# Patient Record
Sex: FEMALE | Race: WHITE | URBAN - METROPOLITAN AREA
[De-identification: names, ages, dates, MRNs, and addresses within clinical notes are randomized per-mention and may not be internally consistent; named-entity substitution may affect disease eponyms.]

---

## 2017-06-02 ENCOUNTER — EMERGENCY (EMERGENCY)
Facility: HOSPITAL | Age: 82
LOS: 1 days | Discharge: HOME | End: 2017-06-02
Attending: EMERGENCY MEDICINE
Payer: MEDICARE

## 2017-06-02 VITALS
WEIGHT: 108.03 LBS | OXYGEN SATURATION: 98 % | DIASTOLIC BLOOD PRESSURE: 59 MMHG | HEART RATE: 63 BPM | SYSTOLIC BLOOD PRESSURE: 135 MMHG | RESPIRATION RATE: 18 BRPM | HEIGHT: 61 IN | TEMPERATURE: 98 F

## 2017-06-02 VITALS
SYSTOLIC BLOOD PRESSURE: 131 MMHG | HEART RATE: 62 BPM | DIASTOLIC BLOOD PRESSURE: 67 MMHG | TEMPERATURE: 98 F | OXYGEN SATURATION: 100 % | RESPIRATION RATE: 19 BRPM

## 2017-06-02 DIAGNOSIS — Z98.49 CATARACT EXTRACTION STATUS, UNSPECIFIED EYE: Chronic | ICD-10-CM

## 2017-06-02 DIAGNOSIS — Z90.5 ACQUIRED ABSENCE OF KIDNEY: Chronic | ICD-10-CM

## 2017-06-02 DIAGNOSIS — Z96.619 PRESENCE OF UNSPECIFIED ARTIFICIAL SHOULDER JOINT: Chronic | ICD-10-CM

## 2017-06-02 PROCEDURE — 99285 EMERGENCY DEPT VISIT HI MDM: CPT | Mod: 25

## 2017-06-02 PROCEDURE — 72125 CT NECK SPINE W/O DYE: CPT | Mod: 26

## 2017-06-02 PROCEDURE — 70486 CT MAXILLOFACIAL W/O DYE: CPT | Mod: 26

## 2017-06-02 PROCEDURE — 70450 CT HEAD/BRAIN W/O DYE: CPT

## 2017-06-02 PROCEDURE — 70450 CT HEAD/BRAIN W/O DYE: CPT | Mod: 26

## 2017-06-02 PROCEDURE — 70486 CT MAXILLOFACIAL W/O DYE: CPT

## 2017-06-02 PROCEDURE — 72125 CT NECK SPINE W/O DYE: CPT

## 2017-06-02 NOTE — ED PROVIDER NOTE - OBJECTIVE STATEMENT
85 y/o F pt w/ PMHx of dementia and TIA presents to the ED c/o fall. Pt's daughter states that she was walking up a step when she slipped and fell. Pt fell in the past and had bleeding in her brain. Pt on Aggrenox. Denies LOC, neck pain or any other complaints. NKDA.

## 2017-06-02 NOTE — ED PROVIDER NOTE - MEDICAL DECISION MAKING DETAILS
Patient with fall forward, hit head on step, no lacs, no LOC, vomiting or neck pain, normal exam with mild facial bruising. Full ROM of all extremities, CT head/c spine/facial bones to r/o ICH, fx. DC if negative, no pain at this time.

## 2017-06-02 NOTE — ED ADULT TRIAGE NOTE - CHIEF COMPLAINT QUOTE
As per daughter walking up the stairs tripped and fell hit nose and head denies loc, has history of bleeding on the brain from previous fall, c/o pain to forehead and nose

## 2017-06-02 NOTE — ED PROVIDER NOTE - SKIN, MLM
Skin normal color for race, warm, dry and intact. No evidence of rash. Skin normal color for race, warm, dry and intact. No evidence of rash. Bruise to the L forehead, no hematoma

## 2017-06-02 NOTE — ED PROVIDER NOTE - NS ED MD SCRIBE ATTENDING SCRIBE SECTIONS
DISPOSITION/VITAL SIGNS( Pullset)/REVIEW OF SYSTEMS/HISTORY OF PRESENT ILLNESS/PHYSICAL EXAM/HIV/PAST MEDICAL/SURGICAL/SOCIAL HISTORY

## 2017-06-02 NOTE — ED PROVIDER NOTE - MUSCULOSKELETAL, MLM
Spine appears normal, range of motion is not limited, no muscle or joint tenderness Spine appears normal, range of motion is not limited, no muscle or joint tenderness. Normal gait
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
## 2017-06-06 DIAGNOSIS — Z91.81 HISTORY OF FALLING: ICD-10-CM
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
## 2017-06-06 DIAGNOSIS — F03.90 UNSPECIFIED DEMENTIA, UNSPECIFIED SEVERITY, WITHOUT BEHAVIORAL DISTURBANCE, PSYCHOTIC DISTURBANCE, MOOD DISTURBANCE, AND ANXIETY: ICD-10-CM

## 2017-06-06 DIAGNOSIS — S09.90XA UNSPECIFIED INJURY OF HEAD, INITIAL ENCOUNTER: ICD-10-CM

## 2017-06-06 DIAGNOSIS — Z86.73 PERSONAL HISTORY OF TRANSIENT ISCHEMIC ATTACK (TIA), AND CEREBRAL INFARCTION WITHOUT RESIDUAL DEFICITS: ICD-10-CM

## 2018-03-22 ENCOUNTER — EMERGENCY (EMERGENCY)
Facility: HOSPITAL | Age: 83
LOS: 1 days | Discharge: HOME | End: 2018-03-22
Attending: EMERGENCY MEDICINE | Admitting: SPECIALIST
Payer: MEDICARE

## 2018-03-22 VITALS
SYSTOLIC BLOOD PRESSURE: 116 MMHG | DIASTOLIC BLOOD PRESSURE: 59 MMHG | TEMPERATURE: 98 F | RESPIRATION RATE: 18 BRPM | HEART RATE: 59 BPM | WEIGHT: 231.49 LBS | HEIGHT: 61 IN | OXYGEN SATURATION: 98 %

## 2018-03-22 DIAGNOSIS — Z96.619 PRESENCE OF UNSPECIFIED ARTIFICIAL SHOULDER JOINT: Chronic | ICD-10-CM

## 2018-03-22 DIAGNOSIS — Z98.49 CATARACT EXTRACTION STATUS, UNSPECIFIED EYE: Chronic | ICD-10-CM

## 2018-03-22 DIAGNOSIS — Z90.5 ACQUIRED ABSENCE OF KIDNEY: Chronic | ICD-10-CM

## 2018-03-22 PROCEDURE — 70450 CT HEAD/BRAIN W/O DYE: CPT

## 2018-03-22 PROCEDURE — 70450 CT HEAD/BRAIN W/O DYE: CPT | Mod: 26

## 2018-03-22 PROCEDURE — 99284 EMERGENCY DEPT VISIT MOD MDM: CPT | Mod: 25

## 2018-03-22 PROCEDURE — 99284 EMERGENCY DEPT VISIT MOD MDM: CPT

## 2018-03-22 NOTE — ED PROVIDER NOTE - OBJECTIVE STATEMENT
85 y/o F pt with PMHx of Dementia and TIA and PSHx of Shoulder Replacement, R Nephrectomy, and Cataract Surgery BIB EMS with family to ED with bruising to the forehead s/p mechanical fall with head trauma at 18:30pm today (x3 hours PTA in ED). Pt's daughter reports pt fell on her buttocks earlier today, and struck her head on a wooden object. In ED, pt is currently at baseline with no complaints. Family denies pt LOC, or any other complaints. Family notes pt is normally ambulatory with a walker at baseline. Family reports pt is taken care of by home health aides at home. NKDA.

## 2018-03-22 NOTE — ED PROVIDER NOTE - MEDICAL DECISION MAKING DETAILS
83 y/o F pt presents with closed head injury. Pt with Hx of dementia. Plan for CT Head, and anticipate d/c if normal.

## 2018-04-11 ENCOUNTER — INPATIENT (INPATIENT)
Facility: HOSPITAL | Age: 83
LOS: 3 days | Discharge: HOME | DRG: 92 | End: 2018-04-15
Attending: INTERNAL MEDICINE | Admitting: INTERNAL MEDICINE
Payer: MEDICARE

## 2018-04-11 VITALS
OXYGEN SATURATION: 98 % | RESPIRATION RATE: 16 BRPM | DIASTOLIC BLOOD PRESSURE: 84 MMHG | TEMPERATURE: 98 F | SYSTOLIC BLOOD PRESSURE: 127 MMHG

## 2018-04-11 DIAGNOSIS — R29.6 REPEATED FALLS: ICD-10-CM

## 2018-04-11 DIAGNOSIS — Z96.619 PRESENCE OF UNSPECIFIED ARTIFICIAL SHOULDER JOINT: Chronic | ICD-10-CM

## 2018-04-11 DIAGNOSIS — Z96.659 PRESENCE OF UNSPECIFIED ARTIFICIAL KNEE JOINT: Chronic | ICD-10-CM

## 2018-04-11 DIAGNOSIS — Z98.49 CATARACT EXTRACTION STATUS, UNSPECIFIED EYE: Chronic | ICD-10-CM

## 2018-04-11 DIAGNOSIS — Z90.5 ACQUIRED ABSENCE OF KIDNEY: Chronic | ICD-10-CM

## 2018-04-11 LAB
ACETONE SERPL-MCNC: NEGATIVE — SIGNIFICANT CHANGE UP
ALBUMIN SERPL ELPH-MCNC: 3.4 G/DL — LOW (ref 3.5–5)
ALP SERPL-CCNC: 97 U/L — SIGNIFICANT CHANGE UP (ref 40–120)
ALT FLD-CCNC: 20 U/L DA — SIGNIFICANT CHANGE UP (ref 10–60)
ANION GAP SERPL CALC-SCNC: 7 MMOL/L — SIGNIFICANT CHANGE UP (ref 5–17)
APPEARANCE UR: CLEAR — SIGNIFICANT CHANGE UP
APTT BLD: 33.7 SEC — SIGNIFICANT CHANGE UP (ref 27.5–37.4)
AST SERPL-CCNC: 22 U/L — SIGNIFICANT CHANGE UP (ref 10–40)
BACTERIA # UR AUTO: ABNORMAL /HPF
BASOPHILS # BLD AUTO: 0.1 K/UL — SIGNIFICANT CHANGE UP (ref 0–0.2)
BASOPHILS NFR BLD AUTO: 1.2 % — SIGNIFICANT CHANGE UP (ref 0–2)
BILIRUB SERPL-MCNC: 0.3 MG/DL — SIGNIFICANT CHANGE UP (ref 0.2–1.2)
BILIRUB UR-MCNC: NEGATIVE — SIGNIFICANT CHANGE UP
BUN SERPL-MCNC: 14 MG/DL — SIGNIFICANT CHANGE UP (ref 7–18)
CALCIUM SERPL-MCNC: 9.1 MG/DL — SIGNIFICANT CHANGE UP (ref 8.4–10.5)
CHLORIDE SERPL-SCNC: 105 MMOL/L — SIGNIFICANT CHANGE UP (ref 96–108)
CK SERPL-CCNC: 175 U/L — SIGNIFICANT CHANGE UP (ref 21–215)
CO2 SERPL-SCNC: 30 MMOL/L — SIGNIFICANT CHANGE UP (ref 22–31)
COLOR SPEC: YELLOW — SIGNIFICANT CHANGE UP
CREAT SERPL-MCNC: 0.92 MG/DL — SIGNIFICANT CHANGE UP (ref 0.5–1.3)
DIFF PNL FLD: ABNORMAL
EOSINOPHIL # BLD AUTO: 0.1 K/UL — SIGNIFICANT CHANGE UP (ref 0–0.5)
EOSINOPHIL NFR BLD AUTO: 1.2 % — SIGNIFICANT CHANGE UP (ref 0–6)
EPI CELLS # UR: ABNORMAL /HPF
GLUCOSE SERPL-MCNC: 92 MG/DL — SIGNIFICANT CHANGE UP (ref 70–99)
GLUCOSE UR QL: NEGATIVE — SIGNIFICANT CHANGE UP
HCT VFR BLD CALC: 41 % — SIGNIFICANT CHANGE UP (ref 34.5–45)
HGB BLD-MCNC: 12.3 G/DL — SIGNIFICANT CHANGE UP (ref 11.5–15.5)
INR BLD: 1.13 RATIO — SIGNIFICANT CHANGE UP (ref 0.88–1.16)
KETONES UR-MCNC: NEGATIVE — SIGNIFICANT CHANGE UP
LACTATE SERPL-SCNC: 1.2 MMOL/L — SIGNIFICANT CHANGE UP (ref 0.7–2)
LEUKOCYTE ESTERASE UR-ACNC: NEGATIVE — SIGNIFICANT CHANGE UP
LYMPHOCYTES # BLD AUTO: 2.2 K/UL — SIGNIFICANT CHANGE UP (ref 1–3.3)
LYMPHOCYTES # BLD AUTO: 32.6 % — SIGNIFICANT CHANGE UP (ref 13–44)
MAGNESIUM SERPL-MCNC: 2.2 MG/DL — SIGNIFICANT CHANGE UP (ref 1.6–2.6)
MCHC RBC-ENTMCNC: 28.2 PG — SIGNIFICANT CHANGE UP (ref 27–34)
MCHC RBC-ENTMCNC: 30 GM/DL — LOW (ref 32–36)
MCV RBC AUTO: 93.9 FL — SIGNIFICANT CHANGE UP (ref 80–100)
MONOCYTES # BLD AUTO: 0.5 K/UL — SIGNIFICANT CHANGE UP (ref 0–0.9)
MONOCYTES NFR BLD AUTO: 7 % — SIGNIFICANT CHANGE UP (ref 2–14)
NEUTROPHILS # BLD AUTO: 3.9 K/UL — SIGNIFICANT CHANGE UP (ref 1.8–7.4)
NEUTROPHILS NFR BLD AUTO: 58 % — SIGNIFICANT CHANGE UP (ref 43–77)
NITRITE UR-MCNC: NEGATIVE — SIGNIFICANT CHANGE UP
PH UR: 5 — SIGNIFICANT CHANGE UP (ref 5–8)
PLATELET # BLD AUTO: 328 K/UL — SIGNIFICANT CHANGE UP (ref 150–400)
POTASSIUM SERPL-MCNC: 3.9 MMOL/L — SIGNIFICANT CHANGE UP (ref 3.5–5.3)
POTASSIUM SERPL-SCNC: 3.9 MMOL/L — SIGNIFICANT CHANGE UP (ref 3.5–5.3)
PROT SERPL-MCNC: 7.2 G/DL — SIGNIFICANT CHANGE UP (ref 6–8.3)
PROT UR-MCNC: NEGATIVE — SIGNIFICANT CHANGE UP
PROTHROM AB SERPL-ACNC: 12.3 SEC — SIGNIFICANT CHANGE UP (ref 9.8–12.7)
RBC # BLD: 4.37 M/UL — SIGNIFICANT CHANGE UP (ref 3.8–5.2)
RBC # FLD: 13.2 % — SIGNIFICANT CHANGE UP (ref 10.3–14.5)
RBC CASTS # UR COMP ASSIST: ABNORMAL /HPF (ref 0–2)
SODIUM SERPL-SCNC: 142 MMOL/L — SIGNIFICANT CHANGE UP (ref 135–145)
SP GR SPEC: 1.01 — SIGNIFICANT CHANGE UP (ref 1.01–1.02)
UROBILINOGEN FLD QL: NEGATIVE — SIGNIFICANT CHANGE UP
WBC # BLD: 6.7 K/UL — SIGNIFICANT CHANGE UP (ref 3.8–10.5)
WBC # FLD AUTO: 6.7 K/UL — SIGNIFICANT CHANGE UP (ref 3.8–10.5)
WBC UR QL: SIGNIFICANT CHANGE UP /HPF (ref 0–5)

## 2018-04-11 PROCEDURE — 99285 EMERGENCY DEPT VISIT HI MDM: CPT

## 2018-04-11 PROCEDURE — 71260 CT THORAX DX C+: CPT | Mod: 26

## 2018-04-11 PROCEDURE — 74177 CT ABD & PELVIS W/CONTRAST: CPT | Mod: 26

## 2018-04-11 RX ORDER — ONDANSETRON 8 MG/1
4 TABLET, FILM COATED ORAL ONCE
Qty: 0 | Refills: 0 | Status: COMPLETED | OUTPATIENT
Start: 2018-04-11 | End: 2018-04-11

## 2018-04-11 RX ORDER — MORPHINE SULFATE 50 MG/1
4 CAPSULE, EXTENDED RELEASE ORAL ONCE
Qty: 0 | Refills: 0 | Status: DISCONTINUED | OUTPATIENT
Start: 2018-04-11 | End: 2018-04-11

## 2018-04-11 RX ORDER — SODIUM CHLORIDE 9 MG/ML
1000 INJECTION INTRAMUSCULAR; INTRAVENOUS; SUBCUTANEOUS
Qty: 0 | Refills: 0 | Status: DISCONTINUED | OUTPATIENT
Start: 2018-04-11 | End: 2018-04-13

## 2018-04-11 RX ADMIN — SODIUM CHLORIDE 125 MILLILITER(S): 9 INJECTION INTRAMUSCULAR; INTRAVENOUS; SUBCUTANEOUS at 23:16

## 2018-04-11 RX ADMIN — ONDANSETRON 4 MILLIGRAM(S): 8 TABLET, FILM COATED ORAL at 16:00

## 2018-04-11 RX ADMIN — SODIUM CHLORIDE 125 MILLILITER(S): 9 INJECTION INTRAMUSCULAR; INTRAVENOUS; SUBCUTANEOUS at 16:41

## 2018-04-11 RX ADMIN — MORPHINE SULFATE 4 MILLIGRAM(S): 50 CAPSULE, EXTENDED RELEASE ORAL at 16:30

## 2018-04-11 RX ADMIN — MORPHINE SULFATE 4 MILLIGRAM(S): 50 CAPSULE, EXTENDED RELEASE ORAL at 16:00

## 2018-04-11 NOTE — ED ADULT NURSE REASSESSMENT NOTE - NS ED NURSE REASSESS COMMENT FT1
Pt. is in stable condition. No complaints voiced. family at bedside. Pt. is stable to go to [address] [place] [address] for continued care.

## 2018-04-11 NOTE — ED PROVIDER NOTE - MUSCULOSKELETAL, MLM
Spine appears kyphotic, range of motion is not limited, R lower back slight tenderness to palpation, straight leg 90 degrees. Anterior R lower rib tenderness to palpation.

## 2018-04-11 NOTE — ED ADULT NURSE NOTE - FINAL NURSING ELECTRONIC SIGNATURE
I personally performed the service described in the documentation recorded by the scribe in my presence, and it accurately and completely records my words and actions.
11-Apr-2018 22:48

## 2018-04-11 NOTE — ED PROVIDER NOTE - OBJECTIVE STATEMENT
83 y/o F pt w/ PMHx of TIA, Dementia BIB daughter; as per daughter, pt fell twice this week fell off a step at home last night c/o R sided lower rib pain, back pain and abd pain. No head injury or LOC, no vomiting, no fever. Pt saw PMD and was sent in for further eval. NKDA.

## 2018-04-11 NOTE — ED PROVIDER NOTE - PROGRESS NOTE DETAILS
frequent falls, lower rib pain, abd pain-no acute pathology, will admit to Dr. Finley.  Spoke with Dr. Foster, pt's neurologist. case d/w Dr. Finley

## 2018-04-11 NOTE — ED PROVIDER NOTE - MEDICAL DECISION MAKING DETAILS
pt s/p fall twice this week, now with R lower rib pain and abd pain, concern for fx and abd trauma, will get CTs, labs and admit.

## 2018-04-11 NOTE — ED PROVIDER NOTE - PSH
H/O shoulder replacement    History of nephrectomy  right  S/P cataract surgery    S/P knee replacement

## 2018-04-12 ENCOUNTER — TRANSCRIPTION ENCOUNTER (OUTPATIENT)
Age: 83
End: 2018-04-12

## 2018-04-12 DIAGNOSIS — R29.6 REPEATED FALLS: ICD-10-CM

## 2018-04-12 DIAGNOSIS — F03.90 UNSPECIFIED DEMENTIA WITHOUT BEHAVIORAL DISTURBANCE: ICD-10-CM

## 2018-04-12 DIAGNOSIS — R91.1 SOLITARY PULMONARY NODULE: ICD-10-CM

## 2018-04-12 DIAGNOSIS — E04.1 NONTOXIC SINGLE THYROID NODULE: ICD-10-CM

## 2018-04-12 DIAGNOSIS — Z29.9 ENCOUNTER FOR PROPHYLACTIC MEASURES, UNSPECIFIED: ICD-10-CM

## 2018-04-12 LAB
24R-OH-CALCIDIOL SERPL-MCNC: 20.3 NG/ML — LOW (ref 30–80)
ANION GAP SERPL CALC-SCNC: 5 MMOL/L — SIGNIFICANT CHANGE UP (ref 5–17)
BUN SERPL-MCNC: 14 MG/DL — SIGNIFICANT CHANGE UP (ref 7–18)
CALCIUM SERPL-MCNC: 8.5 MG/DL — SIGNIFICANT CHANGE UP (ref 8.4–10.5)
CHLORIDE SERPL-SCNC: 108 MMOL/L — SIGNIFICANT CHANGE UP (ref 96–108)
CHOLEST SERPL-MCNC: 112 MG/DL — SIGNIFICANT CHANGE UP (ref 10–199)
CO2 SERPL-SCNC: 30 MMOL/L — SIGNIFICANT CHANGE UP (ref 22–31)
CREAT SERPL-MCNC: 0.96 MG/DL — SIGNIFICANT CHANGE UP (ref 0.5–1.3)
GLUCOSE SERPL-MCNC: 85 MG/DL — SIGNIFICANT CHANGE UP (ref 70–99)
HBA1C BLD-MCNC: 5.5 % — SIGNIFICANT CHANGE UP (ref 4–5.6)
HCT VFR BLD CALC: 36.3 % — SIGNIFICANT CHANGE UP (ref 34.5–45)
HDLC SERPL-MCNC: 49 MG/DL — SIGNIFICANT CHANGE UP (ref 40–125)
HGB BLD-MCNC: 10.9 G/DL — LOW (ref 11.5–15.5)
LIPID PNL WITH DIRECT LDL SERPL: 41 MG/DL — SIGNIFICANT CHANGE UP
MCHC RBC-ENTMCNC: 28.6 PG — SIGNIFICANT CHANGE UP (ref 27–34)
MCHC RBC-ENTMCNC: 30.1 GM/DL — LOW (ref 32–36)
MCV RBC AUTO: 95.3 FL — SIGNIFICANT CHANGE UP (ref 80–100)
PLATELET # BLD AUTO: 277 K/UL — SIGNIFICANT CHANGE UP (ref 150–400)
POTASSIUM SERPL-MCNC: 4.2 MMOL/L — SIGNIFICANT CHANGE UP (ref 3.5–5.3)
POTASSIUM SERPL-SCNC: 4.2 MMOL/L — SIGNIFICANT CHANGE UP (ref 3.5–5.3)
RBC # BLD: 3.81 M/UL — SIGNIFICANT CHANGE UP (ref 3.8–5.2)
RBC # FLD: 13.6 % — SIGNIFICANT CHANGE UP (ref 10.3–14.5)
SODIUM SERPL-SCNC: 143 MMOL/L — SIGNIFICANT CHANGE UP (ref 135–145)
TOTAL CHOLESTEROL/HDL RATIO MEASUREMENT: 2.3 RATIO — LOW (ref 3.3–7.1)
TRIGL SERPL-MCNC: 112 MG/DL — SIGNIFICANT CHANGE UP (ref 10–149)
TSH SERPL-MCNC: 6.67 UU/ML — HIGH (ref 0.34–4.82)
VIT B12 SERPL-MCNC: 393 PG/ML — SIGNIFICANT CHANGE UP (ref 232–1245)
WBC # BLD: 4.7 K/UL — SIGNIFICANT CHANGE UP (ref 3.8–10.5)
WBC # FLD AUTO: 4.7 K/UL — SIGNIFICANT CHANGE UP (ref 3.8–10.5)

## 2018-04-12 PROCEDURE — 70450 CT HEAD/BRAIN W/O DYE: CPT | Mod: 26

## 2018-04-12 RX ORDER — CLONAZEPAM 1 MG
0.5 TABLET ORAL ONCE
Qty: 0 | Refills: 0 | Status: DISCONTINUED | OUTPATIENT
Start: 2018-04-12 | End: 2018-04-12

## 2018-04-12 RX ORDER — CHOLECALCIFEROL (VITAMIN D3) 125 MCG
1000 CAPSULE ORAL DAILY
Qty: 0 | Refills: 0 | Status: DISCONTINUED | OUTPATIENT
Start: 2018-04-12 | End: 2018-04-13

## 2018-04-12 RX ORDER — OXYCODONE AND ACETAMINOPHEN 5; 325 MG/1; MG/1
1 TABLET ORAL EVERY 6 HOURS
Qty: 0 | Refills: 0 | Status: DISCONTINUED | OUTPATIENT
Start: 2018-04-12 | End: 2018-04-15

## 2018-04-12 RX ORDER — ENOXAPARIN SODIUM 100 MG/ML
40 INJECTION SUBCUTANEOUS DAILY
Qty: 0 | Refills: 0 | Status: DISCONTINUED | OUTPATIENT
Start: 2018-04-12 | End: 2018-04-15

## 2018-04-12 RX ORDER — ACETAMINOPHEN 500 MG
650 TABLET ORAL EVERY 6 HOURS
Qty: 0 | Refills: 0 | Status: DISCONTINUED | OUTPATIENT
Start: 2018-04-12 | End: 2018-04-15

## 2018-04-12 RX ADMIN — Medication 0.5 MILLIGRAM(S): at 21:33

## 2018-04-12 RX ADMIN — ENOXAPARIN SODIUM 40 MILLIGRAM(S): 100 INJECTION SUBCUTANEOUS at 11:50

## 2018-04-12 RX ADMIN — SODIUM CHLORIDE 125 MILLILITER(S): 9 INJECTION INTRAMUSCULAR; INTRAVENOUS; SUBCUTANEOUS at 06:40

## 2018-04-12 RX ADMIN — SODIUM CHLORIDE 125 MILLILITER(S): 9 INJECTION INTRAMUSCULAR; INTRAVENOUS; SUBCUTANEOUS at 21:34

## 2018-04-12 NOTE — H&P ADULT - PROBLEM SELECTOR PLAN 4
IMPROVE VTE Individual Risk Assessment    RISK                                                          Points  [] Previous VTE                                           3  [] Thrombophilia                                        2  [] Lower limb paralysis                              2   [] Current Cancer                                       2   [x] Immobilization > 24 hrs                        1  [] ICU/CCU stay > 24 hours                       1  [x] Age > 60                                                   1    IMPROVE VTE Score: 2    need for DVT ppx, on lovenox  no need for GI ppx baseline AAO X 0-1  Pt daughter [place] [phone] will bring pt home meds in the morning, primary care teat please follow up

## 2018-04-12 NOTE — H&P ADULT - ATTENDING COMMENTS
83 y/o female admitted with frequent falls.   PMH;severe dementia,rt nephrectomy for RCC, tia  Work-up neg-except 4mm nodule rt middle lobe.  PLAN; phy tx/neuro f/u Dr. Foster,. 85 y/o female admitted with frequent falls.   PMH;severe dementia,rt nephrectomy for RCC, tia  Work-up neg-except 4mm nodule rt middle lobe.  PLAN; phy tx/neuro f/u Dr. Foster,.           ct chest 1 yr.

## 2018-04-12 NOTE — H&P ADULT - HISTORY OF PRESENT ILLNESS
Patient is a 85 yo female from home with 24 hrs HHA, baseline walking with assistant baseline AAO x1 with PMH of TIA, severe dementia BIB her daughter.  As  per daughter, pt fell down twice this week, and fell off a step at home last night, c/o R sided lower rib pain, radiating to right back and abd pain. No head injury or LOC as per daughter. Pt is very poor historian, obtained history from her daughter and ED provider. As per daughter, pt had no fever, chills,  headache, chest pain, nausea, vomiting, diarrhea, constipation or any other complains. Pt saw her PCP Dr. Cristian KILLIAN yesterday and was sent to ER for further eval.  Pt got her flu vaccine 10/2017.    In ER, pt vitals wnl, labs wnl, UA negative, CT chest and abd shows 9 mm nonspecific hypodense lesion in the left lobe of the thyroid, nonspecific 4 mm right middle lobe lung nodule; 2 left renal cysts measuring up to 1.4 cm. Patient is a 85 yo female from home with 24 hrs HHA, baseline walking with assistant baseline AAO x1 with PMH of TIA, severe dementia BIB her daughter.  As  per daughter, pt fell down twice this week, and fell off a step at home last night, c/o R sided lower rib pain, radiating to right back and abd pain. No head injury or LOC as per daughter. Pt is very poor historian, obtained history from her daughter and ED provider. As per daughter, pt had no fever, chills,  headache, chest pain, nausea, vomiting, diarrhea, constipation or any other complains. Pt saw her PCP Dr. Cristian KILLIAN yesterday and was sent to ER for further eval.  Pt got her flu vaccine 10/2017. Pt daughter [place] [phone] will bring pt home meds in the morning, primary care teat please follow up.     In ER, pt vitals wnl, labs wnl, UA negative, CT chest and abd shows no acute fracture,  9 mm nonspecific hypodense lesion in the left lobe of the thyroid, nonspecific 4 mm right middle lobe lung nodule; 2 left renal cysts measuring up to 1.4 cm. s/p 4mg morphine IV in ED. Patient is a 83 yo female from home with 24 hrs HHA, baseline walking with assistant baseline AAO x1 with PMH of TIA, severe dementia, renal cell cancer (s/p Rt nephrectomy ) BIB her daughter.  As  per daughter, pt fell down twice this week, and fell off a step at home last night, c/o R sided lower rib pain, radiating to right back and abd pain. No head injury or LOC as per daughter. Pt is very poor historian, obtained history from her daughter and ED provider. As per daughter, pt had no fever, chills,  headache, chest pain, nausea, vomiting, diarrhea, constipation or any other complains. Pt saw her PCP Dr. Cristian KILLIAN yesterday and was sent to ER for further eval.  Pt got her flu vaccine 10/2017. Pt daughter [place] [phone] will bring pt home meds in the morning, primary care teat please follow up.     In ER, pt vitals wnl, labs wnl, UA negative, CT chest and abd shows no acute fracture,  9 mm nonspecific hypodense lesion in the left lobe of the thyroid, nonspecific 4 mm right middle lobe lung nodule; 2 left renal cysts measuring up to 1.4 cm. s/p 4mg morphine IV in ED.

## 2018-04-12 NOTE — H&P ADULT - PROBLEM SELECTOR PLAN 5
IMPROVE VTE Individual Risk Assessment    RISK                                                          Points  [] Previous VTE                                           3  [] Thrombophilia                                        2  [] Lower limb paralysis                              2   [] Current Cancer                                       2   [x] Immobilization > 24 hrs                        1  [] ICU/CCU stay > 24 hours                       1  [x] Age > 60                                                   1    IMPROVE VTE Score: 2    need for DVT ppx, on lovenox  no need for GI ppx

## 2018-04-12 NOTE — PHYSICAL THERAPY INITIAL EVALUATION ADULT - GROSSLY INTACT, SENSORY
Grossly Intact/to touch, but assessment limited by impaired cognition and ability to follow commands

## 2018-04-12 NOTE — DISCHARGE NOTE ADULT - PLAN OF CARE
social support You have no fractures. You will go home with home physical therapy and 24/7 aide. Please obtain a follow up CT scan of your chest in 12 months to ensure stability of lung nodule. Please follow up with your PCP to have your thyroid function tests rechecked. At this time, your TSH is elevated but your free T4 and T3 are normal. You do not require treatment for thyroid disorder. Please continue taking nuedexta, namzaric, duloxetine, clonazepam as you were before. Follow up with your PCP. Please continue aggrenox, metoprolol, simvastatin. You have Mild age indeterminate compression fractures at the superior endplates of   L1 and L2 vertebrae . You will go home with home physical therapy and 24/7 aide.

## 2018-04-12 NOTE — PHYSICAL THERAPY INITIAL EVALUATION ADULT - ADDITIONAL COMMENTS
Per sister, pt. owns sc and rolling walker.    Pt. reports falling when not using a device and at night.

## 2018-04-12 NOTE — DISCHARGE NOTE ADULT - PATIENT PORTAL LINK FT
You can access the [facility] Patient Portal, offered by [facility], by registering with the following website: [email or web address]

## 2018-04-12 NOTE — H&P ADULT - PROBLEM SELECTOR PLAN 1
CT chest no acute fracture  s/p morphine 4mg IV in ER  started tylenol and percocet for pain management  f/u CT head  f/u PT eval pt had falls x2 this wk, c/o Rt rib pain  CT chest and abd shows no evidence for acute intrathoracic, intra-abdominal or   intrapelvic injury  s/p morphine 4mg IV in ER  started tylenol and percocet for pain management  f/u CT head  f/u PT eval

## 2018-04-12 NOTE — PHYSICAL THERAPY INITIAL EVALUATION ADULT - IMPAIRMENTS FOUND, PT EVAL
gait, locomotion, and balance/poor safety awareness/aerobic capacity/endurance/cognitive impairment/muscle strength/posture

## 2018-04-12 NOTE — CONSULT NOTE ADULT - ASSESSMENT
1.r/o thyroid nodule  left lobe  as seen in ct chest  needs thy sono for further assessment  can be done as op  2.elevated tsh  subclinical hypothyroidism  free t4  no intervention presently  follow tft  3.low vit d  replace

## 2018-04-12 NOTE — H&P ADULT - PROBLEM SELECTOR PLAN 2
pt is former smoker   CT chest shows nonspecific 4 mm right middle lobe lung nodule  will follow-up chest CT may be pursued in 12 months to ensure stability

## 2018-04-12 NOTE — DISCHARGE NOTE ADULT - CARE PLAN
Principal Discharge DX:	Frequent falls  Goal:	social support  Assessment and plan of treatment:	You have no fractures. You will go home with home physical therapy and 24/7 aide.  Secondary Diagnosis:	Lung nodule  Assessment and plan of treatment:	Please obtain a follow up CT scan of your chest in 12 months to ensure stability of lung nodule.  Secondary Diagnosis:	Thyroid nodule  Assessment and plan of treatment:	Please follow up with your PCP to have your thyroid function tests rechecked. At this time, your TSH is elevated but your free T4 and T3 are normal. You do not require treatment for thyroid disorder.  Secondary Diagnosis:	Dementia  Assessment and plan of treatment:	Please continue taking nuedexta, namzaric, duloxetine, clonazepam as you were before. Follow up with your PCP.  Secondary Diagnosis:	TIA (transient ischemic attack)  Assessment and plan of treatment:	Please continue aggrenox, metoprolol, simvastatin. Principal Discharge DX:	Frequent falls  Goal:	social support  Assessment and plan of treatment:	You have Mild age indeterminate compression fractures at the superior endplates of   L1 and L2 vertebrae . You will go home with home physical therapy and 24/7 aide.  Secondary Diagnosis:	Lung nodule  Assessment and plan of treatment:	Please obtain a follow up CT scan of your chest in 12 months to ensure stability of lung nodule.  Secondary Diagnosis:	Thyroid nodule  Assessment and plan of treatment:	Please follow up with your PCP to have your thyroid function tests rechecked. At this time, your TSH is elevated but your free T4 and T3 are normal. You do not require treatment for thyroid disorder.  Secondary Diagnosis:	Dementia  Assessment and plan of treatment:	Please continue taking nuedexta, namzaric, duloxetine, clonazepam as you were before. Follow up with your PCP.  Secondary Diagnosis:	TIA (transient ischemic attack)  Assessment and plan of treatment:	Please continue aggrenox, metoprolol, simvastatin.

## 2018-04-12 NOTE — PHYSICAL THERAPY INITIAL EVALUATION ADULT - CRITERIA FOR SKILLED THERAPEUTIC INTERVENTIONS
impairments found/risk reduction/prevention/therapy frequency/anticipated discharge recommendation/rehab potential/functional limitations in following categories/predicted duration of therapy intervention

## 2018-04-12 NOTE — H&P ADULT - ASSESSMENT
Patient is a 83 yo female from home with 24 hrs HHA, baseline walking with assistant baseline AAO x1 with PMH of TIA, severe dementia BIB her daughter.  As  per daughter, pt fell down twice this week, and fell off a step at home last night, c/o R sided lower rib pain, radiating to right back and abd pain. No head injury or LOC as per daughter.  In ER, pt vitals wnl, labs wnl, UA negative, CT chest and abd shows no acute fracture,  9 mm nonspecific hypodense lesion in the left lobe of the thyroid, nonspecific 4 mm right middle lobe lung nodule; 2 left renal cysts measuring up to 1.4 cm. s/p 4mg morphine IV in ED.    Pt will be admitted to medicine for s/p fall. Patient is a 83 yo female from home with 24 hrs HHA, baseline walking with assistant baseline AAO x1 with PMH of TIA, severe dementia, renal cell cancer (s/p Rt nephrectomy )  BIB her daughter.  As  per daughter, pt fell down twice this week, and fell off a step at home last night, c/o R sided lower rib pain, radiating to right back and abd pain. No head injury or LOC as per daughter.  In ER, pt vitals wnl, labs wnl, UA negative, CT chest and abd shows no acute fracture,  9 mm nonspecific hypodense lesion in the left lobe of the thyroid, nonspecific 4 mm right middle lobe lung nodule; 2 left renal cysts measuring up to 1.4 cm. s/p 4mg morphine IV in ED.    Pt will be admitted to medicine for s/p fall.

## 2018-04-12 NOTE — DISCHARGE NOTE ADULT - MEDICATION SUMMARY - MEDICATIONS TO TAKE
I will START or STAY ON the medications listed below when I get home from the hospital:    clonazePAM 1 mg oral tablet  -- 1 tab(s) by mouth 3 times a day, As Needed for anxiety  -- Indication: For Anxiety    DULoxetine 30 mg oral delayed release capsule  -- orally 2 times a day  -- Indication: For Depression    simvastatin 20 mg oral tablet  -- 1 tab(s) by mouth once a day (at bedtime)  -- Indication: For hyperlipidemia    aspirin-dipyridamole 25 mg-200 mg oral capsule, extended release  -- 1 cap(s) by mouth 2 times a day  -- Indication: For Need for prophylactic measure    metoprolol succinate 50 mg oral tablet, extended release  -- 1 tab(s) by mouth once a day  -- Indication: For hypertension    Namzaric 28 mg-10 mg oral capsule, extended release  -- 1 cap(s) by mouth once a day  -- Indication: For Dementia    Nuedexta 20 mg-10 mg oral capsule  -- 1 cap(s) by mouth every 12 hours  -- Indication: For Dementia     ergocalciferol 50,000 intl units (1.25 mg) oral capsule  -- 1 cap(s) by mouth once a week  -- Indication: For Supplement

## 2018-04-12 NOTE — CONSULT NOTE ADULT - SUBJECTIVE AND OBJECTIVE BOX
HPI:  Patient is a 83 yo female from home with 24 hrs HHA, baseline walking with assistant baseline AAO x1 with PMH of TIA, severe dementia, renal cell cancer (s/p Rt nephrectomy ) BIB her daughter.  As  per daughter, pt fell down twice this week, and fell off a step at home last night, c/o R sided lower rib pain, radiating to right back and abd pain. No head injury or LOC as per daughter. Pt is very poor historian, obtained history from her daughter and ED provider. As per daughter, pt had no fever, chills,  headache, chest pain, nausea, vomiting, diarrhea, constipation or any other complains. Pt saw her PCP Dr. Cristian KILLIAN yesterday and was sent to ER for further eval.  Pt got her flu vaccine 10/2017. Pt daughter [place] [phone] will bring pt home meds in the morning, primary care teat please follow up.     In ER, pt vitals wnl, labs wnl, UA negative, CT chest and abd shows no acute fracture,  9 mm nonspecific hypodense lesion in the left lobe of the thyroid, nonspecific 4 mm right middle lobe lung nodule; 2 left renal cysts measuring up to 1.4 cm. s/p 4mg morphine IV in ED. (2018 02:23)  labs show elevated tsh/low vit d  no documented h/o thyroid problems  hist from chart  pt awake but cannot give details    PAST MEDICAL & SURGICAL HISTORY:  Renal cell cancer  TIA (transient ischemic attack)  Dementia  S/P knee replacement  S/P cataract surgery  H/O shoulder replacement  History of nephrectomy: right      No Known Allergies      acetaminophen   Tablet. 650 milliGRAM(s) Oral every 6 hours PRN  enoxaparin Injectable 40 milliGRAM(s) SubCutaneous daily  oxyCODONE    5 mG/acetaminophen 325 mG 1 Tablet(s) Oral every 6 hours PRN  sodium chloride 0.9%. 1000 milliLiter(s) IV Continuous <Continuous>      Social Hx:    FAMILY HISTORY:  No pertinent family history in first degree relatives      REVIEW OF SYSTEMS:  pt cannot give details  CONSTITUTIONAL: No weakness, fevers or chills  EYES/ENT: No visual changes;  No vertigo or throat pain   NECK: No pain or stiffness  RESPIRATORY: No cough, wheezing, hemoptysis; No shortness of breath  CARDIOVASCULAR: No chest pain or palpitations  GASTROINTESTINAL: No abdominal or epigastric pain. No nausea, vomiting, or hematemesis; No diarrhea or constipation. No melena or hematochezia.  GENITOURINARY: No dysuria, frequency or hematuria  NEUROLOGICAL: No numbness or weakness  SKIN: No itching, burning, rashes, or lesions   All other review of systems is negative unless indicated above.  PHYSICAL EXAM:    Vital Signs Last 24 Hrs  T(C): 37.1 (2018 14:43), Max: 37.1 (2018 14:43)  T(F): 98.7 (2018 14:43), Max: 98.7 (2018 14:43)  HR: 77 (2018 14:43) (70 - 77)  BP: 120/55 (2018 14:43) (100/51 - 129/66)  BP(mean): --  RR: 17 (2018 14:43) (17 - 18)  SpO2: 98% (2018 14:43) (94% - 100%)    Constitutional:    HEENT:elderly female  awake  neck thyroid low lying  no palpable nodule  no stridor  lungs ae fair  cardia reg  abd soft  neuro moves all limbs  Neck:  [  ] Supple  [  ]Lymphadenopathy  [  ] JVD   [  ]No JVD  [  ] Masses   [  ] WNL    CHEST/Respiratory:  [  ] Rales      [  ] Rhonchi      [no  ] Wheezing       [  ] Chest Tenderness  [  ]Clear to auscultation    Cardiovascular:  [  ]S1 S2  [  ] Reg  [  ] Irreg   [  ] Murmurs  [  ]Systolic [  ]Diastolic  [  ]No Murmur    Abdomen:  [  ]  Bowel Sounds   [  ] Soft           [  ] ABD Distention  [  ] Tenderness  [  ] Organomegaly                        [  ] Guarding Rigidity  [  ] No Guarding Rigidity  [  ] Rebound Tenderness [  ] No Rebound Tenderness    Extremities: [  ] Edema  [  ] No edema  [  ] Clubbing   [  ] Cyanosis  [  ] Palpable peripheral pulses                        [  ] No Tender Calf muscles  [  ] Tender Calf muscles    Neurological:  [  ] Alert  [  ] Awake  [  ] Oriented  x                              [  ] Confused    Skin:  [  ] Thrombophlebitis  [  ] Rashes  [  ] Dry  [  ] Ulcers    Ortho:  [  ] Joint Swelling  [  ] Joint erythema [  ] DJD [  ] Increased temp. to touch      LABS/DIAGNOSTIC TESTS                          10.9   4.7   )-----------( 277      ( 2018 07:33 )             36.3     WBC Count: 4.7 K/uL (04-12 @ 07:33)  WBC Count: 6.7 K/uL ( @ 16:20)          143  |  108  |  14  ----------------------------<  85  4.2   |  30  |  0.96    Ca    8.5      2018 07:33  Mg     2.2         TPro  7.2  /  Alb  3.4<L>  /  TBili  0.3  /  DBili  x   /  AST  22  /  ALT  20  /  AlkPhos  97        Urinalysis Basic - ( 2018 22:31 )    Color: Yellow / Appearance: Clear / S.010 / pH: x  Gluc: x / Ketone: Negative  / Bili: Negative / Urobili: Negative   Blood: x / Protein: Negative / Nitrite: Negative   Leuk Esterase: Negative / RBC: 2-5 /HPF / WBC 0-2 /HPF   Sq Epi: x / Non Sq Epi: Moderate /HPF / Bacteria: Few /HPF        LIVER FUNCTIONS - ( 2018 16:20 )  Alb: 3.4 g/dL / Pro: 7.2 g/dL / ALK PHOS: 97 U/L / ALT: 20 U/L DA / AST: 22 U/L / GGT: x             PT/INR - ( 2018 16:20 )   PT: 12.3 sec;   INR: 1.13 ratio         PTT - ( 2018 16:20 )  PTT:33.7 sec    LACTATE:      CULTURES:   acetaminophen   Tablet. 650 milliGRAM(s) Oral every 6 hours PRN  enoxaparin Injectable 40 milliGRAM(s) SubCutaneous daily  oxyCODONE    5 mG/acetaminophen 325 mG 1 Tablet(s) Oral every 6 hours PRN  sodium chloride 0.9%. 1000 milliLiter(s) IV Continuous <Continuous>      RADIOLOGY    CXR:

## 2018-04-12 NOTE — PHYSICAL THERAPY INITIAL EVALUATION ADULT - MANUAL MUSCLE TESTING RESULTS, REHAB EVAL
at least 4/ 5 at extremities and 3+/5 postural muscles. Assessment was limited by impaired ability to follow commands

## 2018-04-12 NOTE — H&P ADULT - NSHPLABSRESULTS_GEN_ALL_CORE
Comprehensive Metabolic Panel (04.11.18 @ 16:20)    Sodium, Serum: 142 mmol/L    Potassium, Serum: 3.9 mmol/L    Chloride, Serum: 105 mmol/L    Carbon Dioxide, Serum: 30 mmol/L    Anion Gap, Serum: 7 mmol/L    Blood Urea Nitrogen, Serum: 14 mg/dL    Creatinine, Serum: 0.92 mg/dL    Glucose, Serum: 92 mg/dL    Calcium, Total Serum: 9.1 mg/dL    Protein Total, Serum: 7.2 g/dL    Albumin, Serum: 3.4 g/dL    Bilirubin Total, Serum: 0.3 mg/dL    Alkaline Phosphatase, Serum: 97 U/L    Aspartate Aminotransferase (AST/SGOT): 22 U/L    Alanine Aminotransferase (ALT/SGPT): 20 U/L DA    eGFR if Non : 57: Interpretative comment  The units for eGFR are ml/min/1.73m2 (normalized body surface area). The  eGFR is calculated from a serum creatinine using the CKD-EPI equation.  Other variables required for calculation are race, age and sex. Among  patients with chronic kidney disease (CKD), the eGFR is useful in  determining the stage of disease according to KDOQI CKD classification.  All eGFR results are reported numerically with the following  interpretation.          GFR                    With                 Without     (ml/min/1.73 m2)    Kidney Damage       Kidney Damage        >= 90                    Stage 1                     Normal        60-89                    Stage 2                     Decreased GFR        30-59     Stage 3                     Stage 3        15-29                    Stage 4                     Stage 4        < 15                      Stage 5                     Stage 5  Each stage of CKD assumes that the associated GFR level has been in  effect for at least 3 months. Determination of stages one and two (with  eGFR > 59 ml/min/m2) requires estimation of kidney damage for at least 3  months as defined by structural or functional abnormalities.  Limitations: All estimates of GFR will be less accurate for patients at  extremes of muscle mass (including but not limited to frail elderly,  critically ill, or cancer patients), those with unusual diets, and those  with conditions associated with reduced secretion or extrarenal  elimination of creatinine. The eGFR equation is not recommended for use  in patients with unstable creatinine levels. mL/min/1.73M2    eGFR if African American: 66 mL/min/1.73M2    Complete Blood Count + Automated Diff (04.11.18 @ 16:20)    WBC Count: 6.7 K/uL    RBC Count: 4.37 M/uL    Hemoglobin: 12.3 g/dL    Hematocrit: 41.0 %    Mean Cell Volume: 93.9 fl    Mean Cell Hemoglobin: 28.2 pg    Mean Cell Hemoglobin Conc: 30.0 gm/dL    Red Cell Distrib Width: 13.2 %    Platelet Count - Automated: 328 K/uL    Auto Neutrophil #: 3.9 K/uL    Auto Lymphocyte #: 2.2 K/uL    Auto Monocyte #: 0.5 K/uL    Auto Eosinophil #: 0.1 K/uL    Auto Basophil #: 0.1 K/uL    Auto Neutrophil %: 58.0: Differential percentages must be correlated with absolute numbers for  clinical significance. %    Auto Lymphocyte %: 32.6 %    Auto Monocyte %: 7.0 %    Auto Eosinophil %: 1.2 %    Auto Basophil %: 1.2 %      < from: CT Chest w/ IV Cont (04.11.18 @ 19:51) >    Impression: No evidence for acute intrathoracic, intra-abdominal or   intrapelvic injury.    Trace right pleural effusion.    Mild age indeterminate compression fractures at the superior endplates of   L1 and L2 vertebrae.    Nonspecific 4 mm right middle lobe lung nodule; if the patient is in the   high risk category (i.e. smoker), follow-up chest CT may be pursued in 12   months to ensure stability.      < end of copied text >

## 2018-04-13 LAB
ANION GAP SERPL CALC-SCNC: 6 MMOL/L — SIGNIFICANT CHANGE UP (ref 5–17)
BASOPHILS # BLD AUTO: 0.1 K/UL — SIGNIFICANT CHANGE UP (ref 0–0.2)
BASOPHILS NFR BLD AUTO: 1 % — SIGNIFICANT CHANGE UP (ref 0–2)
BUN SERPL-MCNC: 10 MG/DL — SIGNIFICANT CHANGE UP (ref 7–18)
CALCIUM SERPL-MCNC: 8.7 MG/DL — SIGNIFICANT CHANGE UP (ref 8.4–10.5)
CHLORIDE SERPL-SCNC: 109 MMOL/L — HIGH (ref 96–108)
CO2 SERPL-SCNC: 28 MMOL/L — SIGNIFICANT CHANGE UP (ref 22–31)
CREAT SERPL-MCNC: 0.78 MG/DL — SIGNIFICANT CHANGE UP (ref 0.5–1.3)
CULTURE RESULTS: NO GROWTH — SIGNIFICANT CHANGE UP
EOSINOPHIL # BLD AUTO: 0.1 K/UL — SIGNIFICANT CHANGE UP (ref 0–0.5)
EOSINOPHIL NFR BLD AUTO: 1.3 % — SIGNIFICANT CHANGE UP (ref 0–6)
GLUCOSE SERPL-MCNC: 93 MG/DL — SIGNIFICANT CHANGE UP (ref 70–99)
HCT VFR BLD CALC: 34.9 % — SIGNIFICANT CHANGE UP (ref 34.5–45)
HGB BLD-MCNC: 10.8 G/DL — LOW (ref 11.5–15.5)
LYMPHOCYTES # BLD AUTO: 1.5 K/UL — SIGNIFICANT CHANGE UP (ref 1–3.3)
LYMPHOCYTES # BLD AUTO: 30.3 % — SIGNIFICANT CHANGE UP (ref 13–44)
MAGNESIUM SERPL-MCNC: 1.9 MG/DL — SIGNIFICANT CHANGE UP (ref 1.6–2.6)
MCHC RBC-ENTMCNC: 28.9 PG — SIGNIFICANT CHANGE UP (ref 27–34)
MCHC RBC-ENTMCNC: 31 GM/DL — LOW (ref 32–36)
MCV RBC AUTO: 93.4 FL — SIGNIFICANT CHANGE UP (ref 80–100)
MONOCYTES # BLD AUTO: 0.4 K/UL — SIGNIFICANT CHANGE UP (ref 0–0.9)
MONOCYTES NFR BLD AUTO: 8.2 % — SIGNIFICANT CHANGE UP (ref 2–14)
NEUTROPHILS # BLD AUTO: 2.9 K/UL — SIGNIFICANT CHANGE UP (ref 1.8–7.4)
NEUTROPHILS NFR BLD AUTO: 59.2 % — SIGNIFICANT CHANGE UP (ref 43–77)
PHOSPHATE SERPL-MCNC: 3.7 MG/DL — SIGNIFICANT CHANGE UP (ref 2.5–4.5)
PLATELET # BLD AUTO: 265 K/UL — SIGNIFICANT CHANGE UP (ref 150–400)
POTASSIUM SERPL-MCNC: 4.1 MMOL/L — SIGNIFICANT CHANGE UP (ref 3.5–5.3)
POTASSIUM SERPL-SCNC: 4.1 MMOL/L — SIGNIFICANT CHANGE UP (ref 3.5–5.3)
RBC # BLD: 3.74 M/UL — LOW (ref 3.8–5.2)
RBC # FLD: 13.4 % — SIGNIFICANT CHANGE UP (ref 10.3–14.5)
SODIUM SERPL-SCNC: 143 MMOL/L — SIGNIFICANT CHANGE UP (ref 135–145)
SPECIMEN SOURCE: SIGNIFICANT CHANGE UP
T3 SERPL-MCNC: 100 NG/DL — SIGNIFICANT CHANGE UP (ref 80–200)
T4 FREE SERPL-MCNC: 1.2 NG/DL — SIGNIFICANT CHANGE UP (ref 0.9–1.8)
WBC # BLD: 5 K/UL — SIGNIFICANT CHANGE UP (ref 3.8–10.5)
WBC # FLD AUTO: 5 K/UL — SIGNIFICANT CHANGE UP (ref 3.8–10.5)

## 2018-04-13 RX ORDER — MEMANTINE HYDROCHLORIDE AND DONEPEZIL HYDROCHLORIDE 21; 10 MG/1; MG/1
1 CAPSULE ORAL
Qty: 0 | Refills: 0 | COMMUNITY

## 2018-04-13 RX ORDER — CLONAZEPAM 1 MG
1 TABLET ORAL
Qty: 0 | Refills: 0 | COMMUNITY

## 2018-04-13 RX ORDER — DULOXETINE HYDROCHLORIDE 30 MG/1
0 CAPSULE, DELAYED RELEASE ORAL
Qty: 0 | Refills: 0 | COMMUNITY

## 2018-04-13 RX ORDER — ERGOCALCIFEROL 1.25 MG/1
50000 CAPSULE ORAL
Qty: 0 | Refills: 0 | Status: DISCONTINUED | OUTPATIENT
Start: 2018-04-13 | End: 2018-04-15

## 2018-04-13 RX ORDER — SIMVASTATIN 20 MG/1
20 TABLET, FILM COATED ORAL AT BEDTIME
Qty: 0 | Refills: 0 | Status: DISCONTINUED | OUTPATIENT
Start: 2018-04-13 | End: 2018-04-15

## 2018-04-13 RX ORDER — DULOXETINE HYDROCHLORIDE 30 MG/1
30 CAPSULE, DELAYED RELEASE ORAL DAILY
Qty: 0 | Refills: 0 | Status: DISCONTINUED | OUTPATIENT
Start: 2018-04-13 | End: 2018-04-15

## 2018-04-13 RX ORDER — ERGOCALCIFEROL 1.25 MG/1
1 CAPSULE ORAL
Qty: 4 | Refills: 0 | OUTPATIENT
Start: 2018-04-13 | End: 2018-05-12

## 2018-04-13 RX ORDER — ASPIRIN AND DIPYRIDAMOLE 25; 200 MG/1; MG/1
1 CAPSULE, EXTENDED RELEASE ORAL
Qty: 0 | Refills: 0 | COMMUNITY

## 2018-04-13 RX ORDER — ASPIRIN AND DIPYRIDAMOLE 25; 200 MG/1; MG/1
1 CAPSULE, EXTENDED RELEASE ORAL
Qty: 0 | Refills: 0 | COMMUNITY
Start: 2018-04-13

## 2018-04-13 RX ORDER — SIMVASTATIN 20 MG/1
1 TABLET, FILM COATED ORAL
Qty: 0 | Refills: 0 | COMMUNITY

## 2018-04-13 RX ORDER — ASPIRIN AND DIPYRIDAMOLE 25; 200 MG/1; MG/1
1 CAPSULE, EXTENDED RELEASE ORAL
Qty: 0 | Refills: 0 | Status: DISCONTINUED | OUTPATIENT
Start: 2018-04-13 | End: 2018-04-15

## 2018-04-13 RX ORDER — METOPROLOL TARTRATE 50 MG
1 TABLET ORAL
Qty: 0 | Refills: 0 | COMMUNITY

## 2018-04-13 RX ORDER — CLONAZEPAM 1 MG
1 TABLET ORAL THREE TIMES A DAY
Qty: 0 | Refills: 0 | Status: DISCONTINUED | OUTPATIENT
Start: 2018-04-13 | End: 2018-04-15

## 2018-04-13 RX ORDER — METOPROLOL TARTRATE 50 MG
50 TABLET ORAL DAILY
Qty: 0 | Refills: 0 | Status: DISCONTINUED | OUTPATIENT
Start: 2018-04-13 | End: 2018-04-15

## 2018-04-13 RX ADMIN — ERGOCALCIFEROL 50000 UNIT(S): 1.25 CAPSULE ORAL at 14:51

## 2018-04-13 RX ADMIN — ENOXAPARIN SODIUM 40 MILLIGRAM(S): 100 INJECTION SUBCUTANEOUS at 13:30

## 2018-04-13 RX ADMIN — ASPIRIN AND DIPYRIDAMOLE 1 CAPSULE(S): 25; 200 CAPSULE, EXTENDED RELEASE ORAL at 19:21

## 2018-04-13 RX ADMIN — SIMVASTATIN 20 MILLIGRAM(S): 20 TABLET, FILM COATED ORAL at 21:58

## 2018-04-13 RX ADMIN — Medication 1 MILLIGRAM(S): at 23:27

## 2018-04-13 RX ADMIN — SODIUM CHLORIDE 125 MILLILITER(S): 9 INJECTION INTRAMUSCULAR; INTRAVENOUS; SUBCUTANEOUS at 02:00

## 2018-04-13 NOTE — PROGRESS NOTE ADULT - ASSESSMENT
-frequent falls  -dementia  -thyroid nodule  -low vit d levels  -subclinical hypothyroidism  -rt middle lobe  4mm nodule  -hx; tia, RCC- rt nephrectomy      PLAN; phys tx            home services            thyroid sono as outpt            vit d

## 2018-04-13 NOTE — PROGRESS NOTE ADULT - PROBLEM SELECTOR PLAN 1
- no fractures  - PT recommended home PT with 24/7 social support  - follow up with social work, case management

## 2018-04-13 NOTE — DIETITIAN INITIAL EVALUATION ADULT. - PROBLEM SELECTOR PLAN 4
baseline AAO X 0-1  Pt daughter [place] [phone] will bring pt home meds in the morning, primary care teat please follow up

## 2018-04-13 NOTE — DIETITIAN INITIAL EVALUATION ADULT. - PROBLEM SELECTOR PLAN 1
pt had falls x2 this wk, c/o Rt rib pain  CT chest and abd shows no evidence for acute intrathoracic, intra-abdominal or   intrapelvic injury  s/p morphine 4mg IV in ER  started tylenol and percocet for pain management  f/u CT head  f/u PT eval

## 2018-04-13 NOTE — PROGRESS NOTE ADULT - ASSESSMENT
1.thyroid nodule  sono  as op  may need fna  as per sono  2.mildly elevated tsh  normal free t4/t3  ok  subclinical hypothyroidism  follow as op

## 2018-04-13 NOTE — PROGRESS NOTE ADULT - PROBLEM SELECTOR PLAN 3
- CT chest shows 9 mm nonspecific hypodense lesion in the left lobe of the thyroid  - TSH elevated but T3, free T4 within normal limits  - may have subclinical hypothyroidism  - will get medications from daughter

## 2018-04-13 NOTE — PROGRESS NOTE ADULT - SUBJECTIVE AND OBJECTIVE BOX
HPI:  Patient is a 83 yo female from home with 24 hrs HHA, baseline walking with assistant baseline AAO x1 with PMH of TIA, severe dementia, renal cell cancer (s/p Rt nephrectomy ) BIB her daughter.  As  per daughter, pt fell down twice this week, and fell off a step at home last night, c/o R sided lower rib pain, radiating to right back and abd pain. No head injury or LOC as per daughter. Pt is very poor historian, obtained history from her daughter and ED provider. As per daughter, pt had no fever, chills,  headache, chest pain, nausea, vomiting, diarrhea, constipation or any other complains. Pt saw her PCP Dr. Cristian KILLIAN yesterday and was sent to ER for further eval.  Pt got her flu vaccine 10/2017. Pt daughter [place] [phone] will bring pt home meds in the morning, primary care teat please follow up.     In ER, pt vitals wnl, labs wnl, UA negative, CT chest and abd shows no acute fracture,  9 mm nonspecific hypodense lesion in the left lobe of the thyroid, nonspecific 4 mm right middle lobe lung nodule; 2 left renal cysts measuring up to 1.4 cm. s/p 4mg morphine IV in ED. (12 Apr 2018 02:23)      Meds:    Allergies:  Allergies    No Known Allergies    Intolerances        REVIEW OF SYSTEMS:  pt with dementia    CONSTITUTIONAL: No weakness, fevers or chills  EYES/ENT: No visual changes;  No vertigo or throat pain   NECK: No pain or stiffness  RESPIRATORY: No cough, wheezing, hemoptysis; No shortness of breath  CARDIOVASCULAR: No chest pain or palpitations  GASTROINTESTINAL: No abdominal or epigastric pain. No nausea, vomiting, or hematemesis; No diarrhea or constipation. No melena or hematochezia.  GENITOURINARY: No dysuria, frequency or hematuria  NEUROLOGICAL: No numbness or weakness  SKIN: No itching, burning, rashes, or lesions   All other review of systems is negative unless indicated above.    PHYSICAL EXAM:    Vital Signs Last 24 Hrs  T(C): 36.5 (13 Apr 2018 14:07), Max: 37.4 (12 Apr 2018 20:31)  T(F): 97.7 (13 Apr 2018 14:07), Max: 99.3 (12 Apr 2018 20:31)  HR: 61 (13 Apr 2018 14:07) (61 - 72)  BP: 128/69 (13 Apr 2018 14:07) (120/77 - 132/54)  BP(mean): --  RR: 18 (13 Apr 2018 14:07) (16 - 18)  SpO2: 96% (13 Apr 2018 14:07) (96% - 97%)    HEENT:elderly frail female  awake  neck no scar  thy low lying  no palpable nodule  lungs ae fair  cardia reg  abd soft  neuro deferred    Neck:  [  ] Supple  [  ] Lymphadenopathy  [  ] JVD  [  ] Masses  [  ] WNL    CHEST/Respiratory: [ ] Clear to auscultation     [  ] Rales      [  ] Rhonchi      [  ] Wheezing       [  ] Chest Tenderness     [  ] WNL    Cardiovascular:  [  ]S1 S2  [  ] Reg  [  ] Irreg   [  ] No Murmurs   [  ] Murmurs  [  ] Systolic [  ] Diastolic    Gastrointestinal:  [  ] Bowel Sounds  [   ] ABD Soft  [  ] ABD Distention   [  ] No Tenderness [  ] Tenderness  [  ] Organomegaly  [  ] Guarding rigidity  [  ] No Guarding rigidity  [  ] Rebound Tenderness [  ] No Rebound Tenderness    Extremities: [  ] Edema  [  ] No Edema  [  ] Clubbing   [  ] Cyanosis  [  ] Palpable peripheral pulses                          [  ] Tender calf muscles    [  ] No Tender Calf Muscles    Neurological:  [  ] Alert  [  ] Awake  [  ] Oriented  x                              [  ] Focal weakness  [  ] No Focal Weakness    Skin:  [  ] Thrombophlebitis  [  ] Rashes  [  ] Dry  [  ] Ulcers    Ortho:  [  ] Joint Swelling  [  ] Joint erythema [  ] DJD [  ] Increased Temperature to Touch      LABS/DIAGNOSTIC TESTS                          10.8   5.0   )-----------( 265      ( 13 Apr 2018 07:09 )             34.9         04-13    143  |  109<H>  |  10  ----------------------------<  93  4.1   |  28  |  0.78    Ca    8.7      13 Apr 2018 07:09  Phos  3.7     04-13  Mg     1.9     04-13        CAPILLARY BLOOD GLUCOSE            Hemoglobin A1C, Whole Blood: 5.5 % (04-12 @ 11:01)    Thyroid Stimulating Hormone, Serum: 6.67 uU/mL (04-12 @ 07:33)    CULTURES:       RADIOLOGY  CXR:    acetaminophen   Tablet. 650 milliGRAM(s) Oral every 6 hours PRN  clonazePAM Tablet 1 milliGRAM(s) Oral three times a day PRN  dipyridamole 200 mG/aspirin 25 mG 1 Capsule(s) Oral two times a day  DULoxetine 30 milliGRAM(s) Oral daily  enoxaparin Injectable 40 milliGRAM(s) SubCutaneous daily  ergocalciferol 93748 Unit(s) Oral <User Schedule>  metoprolol succinate ER 50 milliGRAM(s) Oral daily  oxyCODONE    5 mG/acetaminophen 325 mG 1 Tablet(s) Oral every 6 hours PRN  simvastatin 20 milliGRAM(s) Oral at bedtime

## 2018-04-13 NOTE — PROGRESS NOTE ADULT - SUBJECTIVE AND OBJECTIVE BOX
Patient is a 84y old  Female who presents with a chief complaint of s/p fall (2018 16:50)      INTERVAL HPI/OVERNIGHT EVENTS:  no complaints    VITAL SIGNS:  T(F): 97.7 (18 @ 04:55)  HR: 67 (18 @ 04:55)  BP: 132/54 (18 @ 04:55)  RR: 16 (18 @ 04:55)  SpO2: 97% (18 @ 04:55)  Wt(kg): --  I&O's Detail          REVIEW OF SYSTEMS:    CONSTITUTIONAL:  No fevers, chills, sweats    HEENT:  Eyes:  No diplopia or blurred vision. ENT:  No earache, sore throat or runny nose.    CARDIOVASCULAR:  No pressure, squeezing, tightness, or heaviness about the chest; no palpitations.    RESPIRATORY:  Per HPI    GASTROINTESTINAL:  No abdominal pain, nausea, vomiting or diarrhea.    GENITOURINARY:  No dysuria, frequency or urgency.    NEUROLOGIC:  No paresthesias, fasciculations, seizures or weakness.    PSYCHIATRIC:  No disorder of thought or mood.      PHYSICAL EXAM:    Constitutional:no complaints  ENMT:atnc  Neck:supple  Respiratory:clear  Cardiovascular:rr  Gastrointestinal:soft  Extremities:no edema  Vascular:intact  Neurological:non focal  Musculoskeletal:no edema, normal rom    MEDICATIONS  (STANDING):  cholecalciferol 1000 Unit(s) Oral daily  enoxaparin Injectable 40 milliGRAM(s) SubCutaneous daily  sodium chloride 0.9%. 1000 milliLiter(s) (125 mL/Hr) IV Continuous <Continuous>    MEDICATIONS  (PRN):  acetaminophen   Tablet. 650 milliGRAM(s) Oral every 6 hours PRN Mild Pain (1 - 3)  oxyCODONE    5 mG/acetaminophen 325 mG 1 Tablet(s) Oral every 6 hours PRN Moderate Pain (4 - 6)      Allergies    No Known Allergies          LABS:                        10.8   5.0   )-----------( 265      ( 2018 07:09 )             34.9     04-13    143  |  109<H>  |  10  ----------------------------<  93  4.1   |  28  |  0.78    Ca    8.7      2018 07:09  Phos  3.7     04-13  Mg     1.9     04-13    TPro  7.2  /  Alb  3.4<L>  /  TBili  0.3  /  DBili  x   /  AST  22  /  ALT  20  /  AlkPhos  97  04-11    PT/INR - ( 2018 16:20 )   PT: 12.3 sec;   INR: 1.13 ratio         PTT - ( 2018 16:20 )  PTT:33.7 sec  Urinalysis Basic - ( 2018 22:31 )    Color: Yellow / Appearance: Clear / S.010 / pH: x  Gluc: x / Ketone: Negative  / Bili: Negative / Urobili: Negative   Blood: x / Protein: Negative / Nitrite: Negative   Leuk Esterase: Negative / RBC: 2-5 /HPF / WBC 0-2 /HPF   Sq Epi: x / Non Sq Epi: Moderate /HPF / Bacteria: Few /HPF        CARDIAC MARKERS ( 2018 16:20 )  x     / x     / 175 U/L / x     / x          CAPILLARY BLOOD GLUCOSE            RADIOLOGY & ADDITIONAL TESTS:  PROCEDURE DATE:  2018          INTERPRETATION:  CT of the chest, abdomen, and pelvis with IV contrast    Indication: Trauma. The patient fell.     Technique: Axial multidetector CT images of the chest, abdomen, and   pelvis are acquired following intravenous administration of 50 cc   Omnipaque-350 (50 cc discarded)    Comparison: None.    Findings:    Chest:r trace right pleural effusion. No pericardial effusion. Mild   cardiomegaly. Aortic and coronary artery calcifications are present. No   evidence for aortic dissection, or aneurysm. There is a 9 mm nonspecific   hypodense lesion in the left lobe of the thyroid. No enlarged   mediastinal, hilar, or axillary lymph node. The trachea and central   bronchi appear grossly unremarkable.    No evidence for pneumothorax. Small bilateral atelectasis. No evidence   for pulmonary consolidation or contusion. Nonspecific 4 mm right middle   lobe lung nodule (image 49 series 2).    Abdomen: The liver, gallbladder, pancreas, spleen, and adrenals appear   unremarkable. Their are 2 left renal cysts measuring up to 1.4 cm. There   are other small hypodense lesions in the kidneys bilaterally, too small   to characterize. No evidence for a solid organ laceration.     The common bile duct is not dilated.    The appendix appears normal. Colonic diverticulosis without evidence for   diverticulitis. No bowel obstruction, or grossly thickened bowel wall. No   evidence for free air, free fluid, or enlarged lymph node.    Pelvis: Small left urinary bladder diverticulum. The uterus is not   visualized, probably surgically absent. Sigmoid diverticulosis without   evidence for diverticulitis. No pelvic free fluid, or enlarged lymph node.    Mild age indeterminate compression fractures at the superior endplates of   L1 and L2 vertebrae.    Impression: No evidence for acute intrathoracic, intra-abdominal or   intrapelvic injury.    Trace right pleural effusion.    Mild age indeterminate compression fractures at the superior endplates of   L1 and L2 vertebrae.    Nonspecific 4 mm right middle lobe lung nodule; if the patient is in the   high risk category (i.e. smoker), follow-up chest CT may be pursued in 12   months to ensure stability.                  KEVIN SU M.D., ATTENDING RADIOLOGIST  This document has been electronically signed. 2018  8:19PM

## 2018-04-13 NOTE — PROGRESS NOTE ADULT - SUBJECTIVE AND OBJECTIVE BOX
PGY-3 NOTE:    Patient is an 84 year-old female with PMH of TIA, severe dementia, renal cell cancer who presented with frequent falls.     No events overnight. patient seen and examined at bedside. denies complaints.       T(C): 36.5 (18 @ 04:55), Max: 37.4 (18 @ 20:31)  HR: 67 (18 @ 04:55) (67 - 77)  BP: 132/54 (18 @ 04:55) (120/55 - 132/54)  RR: 16 (18 @ 04:55) (16 - 17)  SpO2: 97% (18 @ 04:55) (96% - 98%)  Wt(kg): --  I&O's Summary      REVIEW OF SYSTEMS: denies fever, chills, SOB, palpitations, chest pain, abdominal pain, nausea, vomiting, diarrhea, constipation, dizziness    MEDICATIONS  (STANDING):  enoxaparin Injectable 40 milliGRAM(s) SubCutaneous daily  ergocalciferol 48950 Unit(s) Oral <User Schedule>    MEDICATIONS  (PRN):  acetaminophen   Tablet. 650 milliGRAM(s) Oral every 6 hours PRN Mild Pain (1 - 3)  oxyCODONE    5 mG/acetaminophen 325 mG 1 Tablet(s) Oral every 6 hours PRN Moderate Pain (4 - 6)      PHYSICAL EXAM:  GENERAL: no distress   HEAD:  Atraumatic, Normocephalic  EYES: PERRLA   ENMT: moist mucus membranes   NECK: Supple  NERVOUS SYSTEM:  Alert & Oriented x0  CHEST/LUNG: CTAB  HEART: Regular rate and rhythm; No murmurs, rubs, or gallops  ABDOMEN: Soft, Nontender, Nondistended; Bowel sounds present  EXTREMITIES:  2+ Peripheral Pulses, No clubbing, cyanosis, or edema  LYMPH: No lymphadenopathy noted  SKIN: No rashes or lesions    LABS:                        10.8   5.0   )-----------( 265      ( 2018 07:09 )             34.9     04-13    143  |  109<H>  |  10  ----------------------------<  93  4.1   |  28  |  0.78    Ca    8.7      2018 07:09  Phos  3.7     04-13  Mg     1.9     04-13    TPro  7.2  /  Alb  3.4<L>  /  TBili  0.3  /  DBili  x   /  AST  22  /  ALT  20  /  AlkPhos  97  04-11    PT/INR - ( 2018 16:20 )   PT: 12.3 sec;   INR: 1.13 ratio         PTT - ( 2018 16:20 )  PTT:33.7 sec  Urinalysis Basic - ( 2018 22:31 )    Color: Yellow / Appearance: Clear / S.010 / pH: x  Gluc: x / Ketone: Negative  / Bili: Negative / Urobili: Negative   Blood: x / Protein: Negative / Nitrite: Negative   Leuk Esterase: Negative / RBC: 2-5 /HPF / WBC 0-2 /HPF   Sq Epi: x / Non Sq Epi: Moderate /HPF / Bacteria: Few /HPF      CAPILLARY BLOOD GLUCOSE            Urinalysis Basic - ( 2018 22:31 )    Color: Yellow / Appearance: Clear / S.010 / pH: x  Gluc: x / Ketone: Negative  / Bili: Negative / Urobili: Negative   Blood: x / Protein: Negative / Nitrite: Negative   Leuk Esterase: Negative / RBC: 2-5 /HPF / WBC 0-2 /HPF   Sq Epi: x / Non Sq Epi: Moderate /HPF / Bacteria: Few /HPF

## 2018-04-14 PROCEDURE — 99232 SBSQ HOSP IP/OBS MODERATE 35: CPT

## 2018-04-14 RX ADMIN — OXYCODONE AND ACETAMINOPHEN 1 TABLET(S): 5; 325 TABLET ORAL at 21:25

## 2018-04-14 RX ADMIN — DULOXETINE HYDROCHLORIDE 30 MILLIGRAM(S): 30 CAPSULE, DELAYED RELEASE ORAL at 12:03

## 2018-04-14 RX ADMIN — OXYCODONE AND ACETAMINOPHEN 1 TABLET(S): 5; 325 TABLET ORAL at 21:03

## 2018-04-14 RX ADMIN — Medication 50 MILLIGRAM(S): at 06:06

## 2018-04-14 RX ADMIN — ASPIRIN AND DIPYRIDAMOLE 1 CAPSULE(S): 25; 200 CAPSULE, EXTENDED RELEASE ORAL at 17:22

## 2018-04-14 RX ADMIN — ASPIRIN AND DIPYRIDAMOLE 1 CAPSULE(S): 25; 200 CAPSULE, EXTENDED RELEASE ORAL at 06:06

## 2018-04-14 RX ADMIN — ENOXAPARIN SODIUM 40 MILLIGRAM(S): 100 INJECTION SUBCUTANEOUS at 12:03

## 2018-04-14 RX ADMIN — SIMVASTATIN 20 MILLIGRAM(S): 20 TABLET, FILM COATED ORAL at 21:00

## 2018-04-14 NOTE — PROGRESS NOTE ADULT - SUBJECTIVE AND OBJECTIVE BOX
HPI:  Patient is a 85 yo female from home with 24 hrs HHA, baseline walking with assistant baseline AAO x1 with PMH of TIA, severe dementia, renal cell cancer (s/p Rt nephrectomy ) BIB her daughter.  As  per daughter, pt fell down twice this week, and fell off a step at home last night, c/o R sided lower rib pain, radiating to right back and abd pain. No head injury or LOC as per daughter. Pt is very poor historian, obtained history from her daughter and ED provider. As per daughter, pt had no fever, chills,  headache, chest pain, nausea, vomiting, diarrhea, constipation or any other complains. Pt saw her PCP Dr. Cristian KILLIAN yesterday and was sent to ER for further eval.  Pt got her flu vaccine 10/2017. Pt daughter [place] [phone] will bring pt home meds in the morning, primary care teat please follow up.     In ER, pt vitals wnl, labs wnl, UA negative, CT chest and abd shows no acute fracture,  9 mm nonspecific hypodense lesion in the left lobe of the thyroid, nonspecific 4 mm right middle lobe lung nodule; 2 left renal cysts measuring up to 1.4 cm. s/p 4mg morphine IV in ED. (12 Apr 2018 02:23)      Meds:    Allergies:  Allergies    No Known Allergies    Intolerances        REVIEW OF SYSTEMS:pt confused      CONSTITUTIONAL: No weakness, fevers or chills  EYES/ENT: No visual changes;  No vertigo or throat pain   NECK: No pain or stiffness  RESPIRATORY: No cough, wheezing, hemoptysis; No shortness of breath  CARDIOVASCULAR: No chest pain or palpitations  GASTROINTESTINAL: No abdominal or epigastric pain. No nausea, vomiting, or hematemesis; No diarrhea or constipation. No melena or hematochezia.  GENITOURINARY: No dysuria, frequency or hematuria  NEUROLOGICAL: No numbness or weakness  SKIN: No itching, burning, rashes, or lesions   All other review of systems is negative unless indicated above.    PHYSICAL EXAM:    Vital Signs Last 24 Hrs  T(C): 37.3 (14 Apr 2018 13:51), Max: 37.3 (14 Apr 2018 13:51)  T(F): 99.1 (14 Apr 2018 13:51), Max: 99.1 (14 Apr 2018 13:51)  HR: 76 (14 Apr 2018 13:51) (63 - 76)  BP: 102/48 (14 Apr 2018 13:51) (102/48 - 135/62)  BP(mean): --  RR: 16 (14 Apr 2018 13:51) (16 - 17)  SpO2: 97% (14 Apr 2018 13:51) (97% - 99%)    HEENT:elderly frail female  awake  neck thy low lying  lungs ae fair  cardia reg  abd soft  neuro moves all limbs    Neck:  [  ] Supple  [  ] Lymphadenopathy  [  ] JVD  [  ] Masses  [  ] WNL    CHEST/Respiratory: [ ] Clear to auscultation     [  ] Rales      [  ] Rhonchi      [  ] Wheezing       [  ] Chest Tenderness     [  ] WNL    Cardiovascular:  [  ]S1 S2  [  ] Reg  [  ] Irreg   [  ] No Murmurs   [  ] Murmurs  [  ] Systolic [  ] Diastolic    Gastrointestinal:  [  ] Bowel Sounds  [   ] ABD Soft  [  ] ABD Distention   [  ] No Tenderness [  ] Tenderness  [  ] Organomegaly  [  ] Guarding rigidity  [  ] No Guarding rigidity  [  ] Rebound Tenderness [  ] No Rebound Tenderness    Extremities: [  ] Edema  [  ] No Edema  [  ] Clubbing   [  ] Cyanosis  [  ] Palpable peripheral pulses                          [  ] Tender calf muscles    [  ] No Tender Calf Muscles    Neurological:  [  ] Alert  [  ] Awake  [  ] Oriented  x                              [  ] Focal weakness  [  ] No Focal Weakness    Skin:  [  ] Thrombophlebitis  [  ] Rashes  [  ] Dry  [  ] Ulcers    Ortho:  [  ] Joint Swelling  [  ] Joint erythema [  ] DJD [  ] Increased Temperature to Touch      LABS/DIAGNOSTIC TESTS                          10.8   5.0   )-----------( 265      ( 13 Apr 2018 07:09 )             34.9         04-13    143  |  109<H>  |  10  ----------------------------<  93  4.1   |  28  |  0.78    Ca    8.7      13 Apr 2018 07:09  Phos  3.7     04-13  Mg     1.9     04-13        CAPILLARY BLOOD GLUCOSE            Hemoglobin A1C, Whole Blood: 5.5 % (04-12 @ 11:01)    Thyroid Stimulating Hormone, Serum: 6.67 uU/mL (04-12 @ 07:33)    CULTURES:       RADIOLOGY  CXR:    acetaminophen   Tablet. 650 milliGRAM(s) Oral every 6 hours PRN  clonazePAM Tablet 1 milliGRAM(s) Oral three times a day PRN  dipyridamole 200 mG/aspirin 25 mG 1 Capsule(s) Oral two times a day  DULoxetine 30 milliGRAM(s) Oral daily  enoxaparin Injectable 40 milliGRAM(s) SubCutaneous daily  ergocalciferol 23256 Unit(s) Oral <User Schedule>  metoprolol succinate ER 50 milliGRAM(s) Oral daily  oxyCODONE    5 mG/acetaminophen 325 mG 1 Tablet(s) Oral every 6 hours PRN  simvastatin 20 milliGRAM(s) Oral at bedtime

## 2018-04-14 NOTE — PROGRESS NOTE ADULT - ASSESSMENT
1.s/p fall  rehab  2.thy nodule/subclinical hypothyroidism  sono as op  follow tft  3.dementia  supp care  4.s/p rt nephrectomy

## 2018-04-15 VITALS
DIASTOLIC BLOOD PRESSURE: 81 MMHG | HEART RATE: 61 BPM | SYSTOLIC BLOOD PRESSURE: 139 MMHG | OXYGEN SATURATION: 100 % | RESPIRATION RATE: 18 BRPM | TEMPERATURE: 98 F

## 2018-04-15 PROCEDURE — 82306 VITAMIN D 25 HYDROXY: CPT

## 2018-04-15 PROCEDURE — 87040 BLOOD CULTURE FOR BACTERIA: CPT

## 2018-04-15 PROCEDURE — 74177 CT ABD & PELVIS W/CONTRAST: CPT

## 2018-04-15 PROCEDURE — 82550 ASSAY OF CK (CPK): CPT

## 2018-04-15 PROCEDURE — 80053 COMPREHEN METABOLIC PANEL: CPT

## 2018-04-15 PROCEDURE — 93005 ELECTROCARDIOGRAM TRACING: CPT

## 2018-04-15 PROCEDURE — 97162 PT EVAL MOD COMPLEX 30 MIN: CPT

## 2018-04-15 PROCEDURE — 83036 HEMOGLOBIN GLYCOSYLATED A1C: CPT

## 2018-04-15 PROCEDURE — 82009 KETONE BODYS QUAL: CPT

## 2018-04-15 PROCEDURE — 85610 PROTHROMBIN TIME: CPT

## 2018-04-15 PROCEDURE — 71260 CT THORAX DX C+: CPT

## 2018-04-15 PROCEDURE — 99285 EMERGENCY DEPT VISIT HI MDM: CPT | Mod: 25

## 2018-04-15 PROCEDURE — 84439 ASSAY OF FREE THYROXINE: CPT

## 2018-04-15 PROCEDURE — 87086 URINE CULTURE/COLONY COUNT: CPT

## 2018-04-15 PROCEDURE — 83735 ASSAY OF MAGNESIUM: CPT

## 2018-04-15 PROCEDURE — 84100 ASSAY OF PHOSPHORUS: CPT

## 2018-04-15 PROCEDURE — 84443 ASSAY THYROID STIM HORMONE: CPT

## 2018-04-15 PROCEDURE — 81001 URINALYSIS AUTO W/SCOPE: CPT

## 2018-04-15 PROCEDURE — 85027 COMPLETE CBC AUTOMATED: CPT

## 2018-04-15 PROCEDURE — 83605 ASSAY OF LACTIC ACID: CPT

## 2018-04-15 PROCEDURE — 82607 VITAMIN B-12: CPT

## 2018-04-15 PROCEDURE — 80061 LIPID PANEL: CPT

## 2018-04-15 PROCEDURE — 85730 THROMBOPLASTIN TIME PARTIAL: CPT

## 2018-04-15 PROCEDURE — 70450 CT HEAD/BRAIN W/O DYE: CPT

## 2018-04-15 PROCEDURE — 80048 BASIC METABOLIC PNL TOTAL CA: CPT

## 2018-04-15 PROCEDURE — 84480 ASSAY TRIIODOTHYRONINE (T3): CPT

## 2018-04-15 RX ORDER — ERGOCALCIFEROL 1.25 MG/1
1 CAPSULE ORAL
Qty: 5 | Refills: 0 | OUTPATIENT
Start: 2018-04-15 | End: 2018-04-19

## 2018-04-15 RX ADMIN — Medication 50 MILLIGRAM(S): at 06:14

## 2018-04-15 RX ADMIN — ENOXAPARIN SODIUM 40 MILLIGRAM(S): 100 INJECTION SUBCUTANEOUS at 11:24

## 2018-04-15 RX ADMIN — ASPIRIN AND DIPYRIDAMOLE 1 CAPSULE(S): 25; 200 CAPSULE, EXTENDED RELEASE ORAL at 06:14

## 2018-04-15 RX ADMIN — DULOXETINE HYDROCHLORIDE 30 MILLIGRAM(S): 30 CAPSULE, DELAYED RELEASE ORAL at 11:25

## 2018-04-17 LAB
CULTURE RESULTS: SIGNIFICANT CHANGE UP
CULTURE RESULTS: SIGNIFICANT CHANGE UP
SPECIMEN SOURCE: SIGNIFICANT CHANGE UP
SPECIMEN SOURCE: SIGNIFICANT CHANGE UP

## 2018-05-19 ENCOUNTER — INPATIENT (INPATIENT)
Facility: HOSPITAL | Age: 83
LOS: 3 days | Discharge: HOME | DRG: 185 | End: 2018-05-23
Attending: INTERNAL MEDICINE | Admitting: INTERNAL MEDICINE
Payer: MEDICARE

## 2018-05-19 VITALS
RESPIRATION RATE: 16 BRPM | WEIGHT: 104.94 LBS | TEMPERATURE: 97 F | SYSTOLIC BLOOD PRESSURE: 132 MMHG | HEART RATE: 62 BPM | OXYGEN SATURATION: 96 % | HEIGHT: 62 IN | DIASTOLIC BLOOD PRESSURE: 85 MMHG

## 2018-05-19 DIAGNOSIS — Z96.619 PRESENCE OF UNSPECIFIED ARTIFICIAL SHOULDER JOINT: Chronic | ICD-10-CM

## 2018-05-19 DIAGNOSIS — Z96.659 PRESENCE OF UNSPECIFIED ARTIFICIAL KNEE JOINT: Chronic | ICD-10-CM

## 2018-05-19 DIAGNOSIS — Z90.5 ACQUIRED ABSENCE OF KIDNEY: Chronic | ICD-10-CM

## 2018-05-19 DIAGNOSIS — Z98.49 CATARACT EXTRACTION STATUS, UNSPECIFIED EYE: Chronic | ICD-10-CM

## 2018-05-19 RX ORDER — ACETAMINOPHEN 500 MG
650 TABLET ORAL ONCE
Qty: 0 | Refills: 0 | Status: COMPLETED | OUTPATIENT
Start: 2018-05-19 | End: 2018-05-19

## 2018-05-19 RX ADMIN — Medication 650 MILLIGRAM(S): at 21:36

## 2018-05-19 NOTE — ED PROVIDER NOTE - PROGRESS NOTE DETAILS
signed out to me pending labs for admission as would be unsafe DC as family states they cannot take her home.

## 2018-05-19 NOTE — ED PROVIDER NOTE - PHYSICAL EXAMINATION
Gen: Alert, NAD, eldery and fral   Head: NC, AT   Eyes: PERRL, EOMI, normal lids/conjunctiva  ENT: normal hearing, patent oropharynx without erythema/exudate, uvula midline  Neck: supple, no tenderness, Trachea midline  Pulm: Bilateral BS, normal resp effort, no wheeze/stridor/retractions  CV: RRR, no M/R/G, 2+ radial and dp pulses bl, no edema  Abd: soft, NT/ND, +BS, no hepatosplenomegaly  Mskel: extremities x4 with normal ROM and no joint effusions. no ctl spine ttp.   Skin: no rash, no bruising   Neuro: AAOx2, no sensory/motor deficits, CN 2-12 intact

## 2018-05-19 NOTE — ED PROVIDER NOTE - MEDICAL DECISION MAKING DETAILS
demented pt pw [facility] fall. demented pt pw mech fall. ct head, cspine and chest reassuring. okay for dc home. demented pt pw mech fall. ct head, cspine reassuring. ct chest shows right rib fxs, but that is not the site of patients pain. dc with analgesia. okay for dc home. demented pt pw mech fall. ct head, cspine reassuring. ct chest shows right rib fxs, but that is not the site of patients pain. patients family maintains they are not comfortable taking care of her and want her addmitted. will do so.

## 2018-05-20 DIAGNOSIS — S22.31XA FRACTURE OF ONE RIB, RIGHT SIDE, INITIAL ENCOUNTER FOR CLOSED FRACTURE: ICD-10-CM

## 2018-05-20 DIAGNOSIS — R07.81 PLEURODYNIA: ICD-10-CM

## 2018-05-20 DIAGNOSIS — F32.9 MAJOR DEPRESSIVE DISORDER, SINGLE EPISODE, UNSPECIFIED: ICD-10-CM

## 2018-05-20 DIAGNOSIS — Z29.9 ENCOUNTER FOR PROPHYLACTIC MEASURES, UNSPECIFIED: ICD-10-CM

## 2018-05-20 DIAGNOSIS — F03.90 UNSPECIFIED DEMENTIA WITHOUT BEHAVIORAL DISTURBANCE: ICD-10-CM

## 2018-05-20 PROBLEM — C64.9 MALIGNANT NEOPLASM OF UNSPECIFIED KIDNEY, EXCEPT RENAL PELVIS: Chronic | Status: ACTIVE | Noted: 2018-04-11

## 2018-05-20 LAB
24R-OH-CALCIDIOL SERPL-MCNC: 42.6 NG/ML — SIGNIFICANT CHANGE UP (ref 30–80)
ALBUMIN SERPL ELPH-MCNC: 3.3 G/DL — LOW (ref 3.5–5)
ALP SERPL-CCNC: 100 U/L — SIGNIFICANT CHANGE UP (ref 40–120)
ALT FLD-CCNC: 18 U/L DA — SIGNIFICANT CHANGE UP (ref 10–60)
ANION GAP SERPL CALC-SCNC: 2 MMOL/L — LOW (ref 5–17)
AST SERPL-CCNC: 17 U/L — SIGNIFICANT CHANGE UP (ref 10–40)
BASOPHILS # BLD AUTO: 0.1 K/UL — SIGNIFICANT CHANGE UP (ref 0–0.2)
BASOPHILS NFR BLD AUTO: 1.9 % — SIGNIFICANT CHANGE UP (ref 0–2)
BILIRUB SERPL-MCNC: 0.3 MG/DL — SIGNIFICANT CHANGE UP (ref 0.2–1.2)
BUN SERPL-MCNC: 17 MG/DL — SIGNIFICANT CHANGE UP (ref 7–18)
CALCIUM SERPL-MCNC: 9.3 MG/DL — SIGNIFICANT CHANGE UP (ref 8.4–10.5)
CHLORIDE SERPL-SCNC: 104 MMOL/L — SIGNIFICANT CHANGE UP (ref 96–108)
CO2 SERPL-SCNC: 34 MMOL/L — HIGH (ref 22–31)
CREAT SERPL-MCNC: 1.13 MG/DL — SIGNIFICANT CHANGE UP (ref 0.5–1.3)
EOSINOPHIL # BLD AUTO: 0.2 K/UL — SIGNIFICANT CHANGE UP (ref 0–0.5)
EOSINOPHIL NFR BLD AUTO: 2.6 % — SIGNIFICANT CHANGE UP (ref 0–6)
GLUCOSE SERPL-MCNC: 82 MG/DL — SIGNIFICANT CHANGE UP (ref 70–99)
HCT VFR BLD CALC: 39.9 % — SIGNIFICANT CHANGE UP (ref 34.5–45)
HGB BLD-MCNC: 12.3 G/DL — SIGNIFICANT CHANGE UP (ref 11.5–15.5)
LYMPHOCYTES # BLD AUTO: 2.6 K/UL — SIGNIFICANT CHANGE UP (ref 1–3.3)
LYMPHOCYTES # BLD AUTO: 36 % — SIGNIFICANT CHANGE UP (ref 13–44)
MCHC RBC-ENTMCNC: 28.6 PG — SIGNIFICANT CHANGE UP (ref 27–34)
MCHC RBC-ENTMCNC: 30.8 GM/DL — LOW (ref 32–36)
MCV RBC AUTO: 93 FL — SIGNIFICANT CHANGE UP (ref 80–100)
MONOCYTES # BLD AUTO: 0.6 K/UL — SIGNIFICANT CHANGE UP (ref 0–0.9)
MONOCYTES NFR BLD AUTO: 8.3 % — SIGNIFICANT CHANGE UP (ref 2–14)
NEUTROPHILS # BLD AUTO: 3.8 K/UL — SIGNIFICANT CHANGE UP (ref 1.8–7.4)
NEUTROPHILS NFR BLD AUTO: 51.2 % — SIGNIFICANT CHANGE UP (ref 43–77)
PLATELET # BLD AUTO: 318 K/UL — SIGNIFICANT CHANGE UP (ref 150–400)
POTASSIUM SERPL-MCNC: 4.2 MMOL/L — SIGNIFICANT CHANGE UP (ref 3.5–5.3)
POTASSIUM SERPL-SCNC: 4.2 MMOL/L — SIGNIFICANT CHANGE UP (ref 3.5–5.3)
PROT SERPL-MCNC: 7.1 G/DL — SIGNIFICANT CHANGE UP (ref 6–8.3)
RBC # BLD: 4.28 M/UL — SIGNIFICANT CHANGE UP (ref 3.8–5.2)
RBC # FLD: 13.4 % — SIGNIFICANT CHANGE UP (ref 10.3–14.5)
SODIUM SERPL-SCNC: 140 MMOL/L — SIGNIFICANT CHANGE UP (ref 135–145)
WBC # BLD: 7.3 K/UL — SIGNIFICANT CHANGE UP (ref 3.8–10.5)
WBC # FLD AUTO: 7.3 K/UL — SIGNIFICANT CHANGE UP (ref 3.8–10.5)

## 2018-05-20 PROCEDURE — 99285 EMERGENCY DEPT VISIT HI MDM: CPT

## 2018-05-20 PROCEDURE — 71250 CT THORAX DX C-: CPT | Mod: 26

## 2018-05-20 PROCEDURE — 70450 CT HEAD/BRAIN W/O DYE: CPT | Mod: 26

## 2018-05-20 PROCEDURE — 72125 CT NECK SPINE W/O DYE: CPT | Mod: 26

## 2018-05-20 RX ORDER — DULOXETINE HYDROCHLORIDE 30 MG/1
30 CAPSULE, DELAYED RELEASE ORAL DAILY
Qty: 0 | Refills: 0 | Status: DISCONTINUED | OUTPATIENT
Start: 2018-05-20 | End: 2018-05-20

## 2018-05-20 RX ORDER — METOPROLOL TARTRATE 50 MG
50 TABLET ORAL DAILY
Qty: 0 | Refills: 0 | Status: DISCONTINUED | OUTPATIENT
Start: 2018-05-20 | End: 2018-05-20

## 2018-05-20 RX ORDER — ASPIRIN AND DIPYRIDAMOLE 25; 200 MG/1; MG/1
1 CAPSULE, EXTENDED RELEASE ORAL
Qty: 0 | Refills: 0 | Status: DISCONTINUED | OUTPATIENT
Start: 2018-05-20 | End: 2018-05-23

## 2018-05-20 RX ORDER — DONEPEZIL HYDROCHLORIDE 10 MG/1
10 TABLET, FILM COATED ORAL AT BEDTIME
Qty: 0 | Refills: 0 | Status: DISCONTINUED | OUTPATIENT
Start: 2018-05-20 | End: 2018-05-23

## 2018-05-20 RX ORDER — METOPROLOL TARTRATE 50 MG
50 TABLET ORAL DAILY
Qty: 0 | Refills: 0 | Status: DISCONTINUED | OUTPATIENT
Start: 2018-05-20 | End: 2018-05-23

## 2018-05-20 RX ORDER — SIMVASTATIN 20 MG/1
20 TABLET, FILM COATED ORAL AT BEDTIME
Qty: 0 | Refills: 0 | Status: DISCONTINUED | OUTPATIENT
Start: 2018-05-20 | End: 2018-05-23

## 2018-05-20 RX ORDER — OXYCODONE AND ACETAMINOPHEN 5; 325 MG/1; MG/1
1 TABLET ORAL EVERY 6 HOURS
Qty: 0 | Refills: 0 | Status: DISCONTINUED | OUTPATIENT
Start: 2018-05-20 | End: 2018-05-21

## 2018-05-20 RX ORDER — CLONAZEPAM 1 MG
1 TABLET ORAL THREE TIMES A DAY
Qty: 0 | Refills: 0 | Status: DISCONTINUED | OUTPATIENT
Start: 2018-05-20 | End: 2018-05-23

## 2018-05-20 RX ORDER — MEMANTINE HYDROCHLORIDE 10 MG/1
10 TABLET ORAL
Qty: 0 | Refills: 0 | Status: DISCONTINUED | OUTPATIENT
Start: 2018-05-20 | End: 2018-05-23

## 2018-05-20 RX ORDER — OXYCODONE AND ACETAMINOPHEN 5; 325 MG/1; MG/1
1 TABLET ORAL ONCE
Qty: 0 | Refills: 0 | Status: DISCONTINUED | OUTPATIENT
Start: 2018-05-20 | End: 2018-05-20

## 2018-05-20 RX ORDER — MEMANTINE HYDROCHLORIDE 10 MG/1
28 TABLET ORAL DAILY
Qty: 0 | Refills: 0 | Status: DISCONTINUED | OUTPATIENT
Start: 2018-05-20 | End: 2018-05-20

## 2018-05-20 RX ORDER — DULOXETINE HYDROCHLORIDE 30 MG/1
30 CAPSULE, DELAYED RELEASE ORAL
Qty: 0 | Refills: 0 | Status: DISCONTINUED | OUTPATIENT
Start: 2018-05-20 | End: 2018-05-23

## 2018-05-20 RX ORDER — ACETAMINOPHEN 500 MG
650 TABLET ORAL EVERY 6 HOURS
Qty: 0 | Refills: 0 | Status: DISCONTINUED | OUTPATIENT
Start: 2018-05-20 | End: 2018-05-23

## 2018-05-20 RX ORDER — ACETAMINOPHEN 500 MG
1 TABLET ORAL
Qty: 28 | Refills: 0 | OUTPATIENT
Start: 2018-05-20 | End: 2018-05-26

## 2018-05-20 RX ORDER — ERGOCALCIFEROL 1.25 MG/1
50000 CAPSULE ORAL
Qty: 0 | Refills: 0 | Status: DISCONTINUED | OUTPATIENT
Start: 2018-05-20 | End: 2018-05-21

## 2018-05-20 RX ORDER — ENOXAPARIN SODIUM 100 MG/ML
30 INJECTION SUBCUTANEOUS DAILY
Qty: 0 | Refills: 0 | Status: DISCONTINUED | OUTPATIENT
Start: 2018-05-20 | End: 2018-05-23

## 2018-05-20 RX ADMIN — ASPIRIN AND DIPYRIDAMOLE 1 CAPSULE(S): 25; 200 CAPSULE, EXTENDED RELEASE ORAL at 06:39

## 2018-05-20 RX ADMIN — DULOXETINE HYDROCHLORIDE 30 MILLIGRAM(S): 30 CAPSULE, DELAYED RELEASE ORAL at 17:09

## 2018-05-20 RX ADMIN — MEMANTINE HYDROCHLORIDE 10 MILLIGRAM(S): 10 TABLET ORAL at 06:39

## 2018-05-20 RX ADMIN — Medication 1 MILLIGRAM(S): at 06:43

## 2018-05-20 RX ADMIN — DULOXETINE HYDROCHLORIDE 30 MILLIGRAM(S): 30 CAPSULE, DELAYED RELEASE ORAL at 06:39

## 2018-05-20 RX ADMIN — MEMANTINE HYDROCHLORIDE 10 MILLIGRAM(S): 10 TABLET ORAL at 17:09

## 2018-05-20 RX ADMIN — Medication 50 MILLIGRAM(S): at 06:39

## 2018-05-20 RX ADMIN — Medication 1 MILLIGRAM(S): at 15:17

## 2018-05-20 RX ADMIN — ENOXAPARIN SODIUM 30 MILLIGRAM(S): 100 INJECTION SUBCUTANEOUS at 11:02

## 2018-05-20 RX ADMIN — SIMVASTATIN 20 MILLIGRAM(S): 20 TABLET, FILM COATED ORAL at 21:58

## 2018-05-20 RX ADMIN — OXYCODONE AND ACETAMINOPHEN 1 TABLET(S): 5; 325 TABLET ORAL at 10:53

## 2018-05-20 RX ADMIN — DONEPEZIL HYDROCHLORIDE 10 MILLIGRAM(S): 10 TABLET, FILM COATED ORAL at 21:58

## 2018-05-20 RX ADMIN — ERGOCALCIFEROL 50000 UNIT(S): 1.25 CAPSULE ORAL at 06:39

## 2018-05-20 RX ADMIN — OXYCODONE AND ACETAMINOPHEN 1 TABLET(S): 5; 325 TABLET ORAL at 09:49

## 2018-05-20 RX ADMIN — ASPIRIN AND DIPYRIDAMOLE 1 CAPSULE(S): 25; 200 CAPSULE, EXTENDED RELEASE ORAL at 17:09

## 2018-05-20 NOTE — H&P ADULT - NSHPLABSRESULTS_GEN_ALL_CORE
Comprehensive Metabolic Panel (05.20.18 @ 02:50)    Sodium, Serum: 140 mmol/L    Potassium, Serum: 4.2 mmol/L    Chloride, Serum: 104 mmol/L    Carbon Dioxide, Serum: 34 mmol/L    Anion Gap, Serum: 2 mmol/L    Blood Urea Nitrogen, Serum: 17 mg/dL    Creatinine, Serum: 1.13 mg/dL    Glucose, Serum: 82 mg/dL    Calcium, Total Serum: 9.3 mg/dL    Protein Total, Serum: 7.1 g/dL    Albumin, Serum: 3.3 g/dL    Bilirubin Total, Serum: 0.3 mg/dL    Alkaline Phosphatase, Serum: 100 U/L    Aspartate Aminotransferase (AST/SGOT): 17 U/L    Alanine Aminotransferase (ALT/SGPT): 18 U/L DA    eGFR if Non : 44: Interpretative comment  The units for eGFR are ml/min/1.73m2 (normalized body surface area). The  eGFR is calculated from a serum creatinine using the CKD-EPI equation.  Other variables required for calculation are race, age and sex. Among  patients with chronic kidney disease (CKD), the eGFR is useful in  determining the stage of disease according to KDOQI CKD classification.  All eGFR results are reported numerically with the following  interpretation.          GFR                    With                 Without     (ml/min/1.73 m2)    Kidney Damage       Kidney Damage        >= 90                    Stage 1                     Normal        60-89                    Stage 2                     Decreased GFR        30-59     Stage 3                     Stage 3        15-29                    Stage 4                     Stage 4        < 15                      Stage 5                     Stage 5  Each stage of CKD assumes that the associated GFR level has been in  effect for at least 3 months. Determination of stages one and two (with  eGFR > 59 ml/min/m2) requires estimation of kidney damage for at least 3  months as defined by structural or functional abnormalities.  Limitations: All estimates of GFR will be less accurate for patients at  extremes of muscle mass (including but not limited to frail elderly,  critically ill, or cancer patients), those with unusual diets, and those  with conditions associated with reduced secretion or extrarenal  elimination of creatinine. The eGFR equation is not recommended for use  in patients with unstable creatinine levels. mL/min/1.73M2    eGFR if African American: 51 mL/min/1.73M2        Complete Blood Count + Automated Diff (05.20.18 @ 02:49)    WBC Count: 7.3 K/uL    RBC Count: 4.28 M/uL    Hemoglobin: 12.3 g/dL    Hematocrit: 39.9 %    Mean Cell Volume: 93.0 fl    Mean Cell Hemoglobin: 28.6 pg    Mean Cell Hemoglobin Conc: 30.8 gm/dL    Red Cell Distrib Width: 13.4 %    Platelet Count - Automated: 318 K/uL      < from: CT Chest No Cont (05.20.18 @ 00:15) >    FINDINGS:    CHEST:     LUNGS, LARGE AIRWAYS, PLEURA: Patent central airways.  No consolidation   or pneumothorax. No pleural effusion. Linear lingular atelectasis  VESSELS: Mild atherosclerosis  HEART: Heartsize is normal. No pericardial effusion.  MEDIASTINUM AND SAMUEL: No lymphadenopathy.  CHEST WALL AND LOWER NECK: 2.2 cm left thyroid nodule.  VISUALIZED UPPER ABDOMEN: Within normal limits.  BONES: Degenerative changes. Right shoulder arthroplasty. Non-displaced   rib fractures along the right lateral seventh through ninth ribs are new   since April 2018, however there appears to be some callus formation.   Chronic L1 and L2 compression deformities.    IMPRESSION:      Non-displaced rib fractures along the right anterolateral seventh   through ninth ribs near the costochondral junctions are new since April 2018, however there appears to be some callus formation. Correlate with   site of pain.    < end of copied text >

## 2018-05-20 NOTE — H&P ADULT - HISTORY OF PRESENT ILLNESS
Patient is a 84 yo female from home with 24 hrs HHA, baseline walking with assistant baseline AAO x1 with PMH of TIA, severe dementia, renal cell cancer (s/p Rt nephrectomy ) BIB her aide complains of right rib pain, s/p fall. Pt currently AAO X1, very poor historian and had no HHA or family member at bedside at this time.  As per ED provider notes, patient was sitting in a chair with her aide when the aide went to bathroom, subsequently patient was found on the ground and complaining of pain in the right rib. Pt was recently admitted in [facility] for s/p multiple falls on 04/12/2018 and no fracture at that time. ROS can not obtained due to poor historian. In ER, pt vitals stable, labs wnl, CT head negative, CT cervical spine No acute cervical spine fracture. CT chest shows  Non-displaced rib fractures along the right anterolateral seventh through ninth ribs near the costochondral junctions. Pt was supposed to be discharge from ED but HHA left and patients family maintains they are not comfortable taking care of her and want her admitted. Patient is a 84 yo female from home with 24 hrs HHA, baseline walking with assistant baseline AAO x1 with PMH of TIA, severe dementia, renal cell cancer (s/p Rt nephrectomy ) BIB her family and aide to E complains of right rib pain, s/p fall. Pt currently AAOx1 (oriented to person), very poor historian. Pt daughter and son at bedside. As per ED provider and faimily, patient was sitting in a chair with her aide when the aide went to bathroom, subsequently patient was found on the ground and complaining of pain in the right rib. Pt had no head injury as per daughter. Pt was recently admitted in [facility] for s/p multiple falls on 04/12/2018 and no fracture at that time. As per pt daughter, pt had no headache, fever, chills, sob, abd pain, nausea, vomiting, diarrhea, constipation or any other complains.    ED course: pt vitals stable, labs wnl, CT head negative, CT cervical spine No acute cervical spine fracture. CT chest shows  Non-displaced rib fractures along the right anterolateral seventh through ninth ribs near the costochondral junctions. Pt was supposed to be discharge from ED but HHA left and patients family maintains they are not comfortable taking care of her and want her admitted. Patient is a 86 yo female from home (with HHA 12hr/day, 7days/wk), baseline walking with assistant baseline AAO x1 with PMH of TIA, severe dementia, renal cell cancer (s/p Rt nephrectomy ) BIB her family and aide to ED complains of right rib pain, s/p fall. Pt currently AAOx1 (oriented to person), very poor historian. Pt daughter and son at bedside. As per family, patient was sitting in a chair with her aide when the aide went to bathroom, subsequently patient was found on the ground and complaining of pain in the right rib. Pt had no head injury as per daughter. Pt was recently admitted in [facility] for s/p multiple falls on 04/12/2018 and no fracture at that time. As per her daughter, pt had no headache, fever, chills, sob, abd pain, nausea, vomiting, diarrhea, constipation or any other complains.    ED course: pt vitals stable, labs wnl, CT head negative, CT cervical spine No acute cervical spine fracture. CT chest shows  Non-displaced rib fractures along the right anterolateral seventh through ninth ribs near the costochondral junctions. Pt was supposed to be discharge from ED but HHA left and pt family maintains they are not comfortable taking care of her and want her to be admitted. Patient is a 84 yo female from home (with HHA 12hr/day, 7days/wk), baseline walking with assistant baseline AAO x1 with PMH of TIA, severe dementia, depression, renal cell cancer (s/p Rt nephrectomy ) BIB her family and aide to ED complains of right rib pain, s/p fall. Pt currently AAOx1 (oriented to person), very poor historian. Pt daughter and son at bedside. As per family, patient was sitting in a chair with her aide when the aide went to bathroom, subsequently patient was found on the ground and complaining of pain in the right rib. Pt had no head injury as per daughter. Pt was recently admitted in [facility] for s/p multiple falls on 04/12/2018 and no fracture at that time. As per her daughter, pt had no headache, fever, chills, sob, abd pain, nausea, vomiting, diarrhea, constipation or any other complains.    ED course: pt vitals stable, labs wnl, CT head negative, CT cervical spine No acute cervical spine fracture. CT chest shows  Non-displaced rib fractures along the right anterolateral seventh through ninth ribs near the costochondral junctions. Pt was supposed to be discharge from ED but HHA left and pt family maintains they are not comfortable taking care of her and want her to be admitted.

## 2018-05-20 NOTE — PATIENT PROFILE ADULT. - NS TRANSFER PATIENT BELONGINGS
1 Ring, 1 pair of socks/Jewelry/Money (specify)/Clothing 1 Ring with green, white and navy colored stone/Jewelry/Money (specify)

## 2018-05-20 NOTE — GOALS OF CARE CONVERSATION - PERSONAL ADVANCE DIRECTIVE - CONVERSATION DETAILS
Discussed benefits and burdens of CPR and intubation given advanced dementia, HCP would like to allow natural death and no cpr/intubations.  DNR order placed.

## 2018-05-20 NOTE — H&P ADULT - MENTAL STATUS
AAO X1, oriented to place, not follow verbal commands AAO X1, oriented to person, not follow verbal commands

## 2018-05-20 NOTE — H&P ADULT - PROBLEM SELECTOR PLAN 1
s/p fall, hx of multiple falls recently  CT head negative and CT cervical spine No acute cervical spine fracture  CT chest shows Non-displaced rib fractures along the right anterolateral seventh through ninth ribs near the costochondral junctions  started tylenol and percocet  PRN for pain management  f/u PT eval  f/u  consult s/p fall, hx of multiple falls recently  CT head negative and CT cervical spine No acute cervical spine fracture  CT chest shows Non-displaced rib fractures along the right anterolateral seventh through ninth ribs near the costochondral junctions  started tylenol and percocet  PRN for pain management  fall precaution   f/u PT eval  f/u  consult

## 2018-05-20 NOTE — H&P ADULT - ASSESSMENT
Patient is a 86 yo female from home with 24 hrs HHA, baseline walking with assistant baseline AAO x1 with PMH of TIA, severe dementia, renal cell cancer (s/p Rt nephrectomy ) BIB her aide complains of right rib pain, s/p fall. CT chest shows  Non-displaced rib fractures along the right anterolateral seventh through ninth ribs near the costochondral junctions. Pt was supposed to be discharge from ED but HHA left and patients family maintains they are not comfortable taking care of her and want her admitted. Pt will be admitted to medicine floor for PT eval and social work consult. Patient is a 86 yo female from home (with HHA 12hr/day, 7days/wk), baseline walking with assistant baseline AAO x1 with PMH of TIA, severe dementia, renal cell cancer (s/p Rt nephrectomy ) BIB her family and aide to ED complains of right rib pain, s/p fall. CT chest shows  Non-displaced rib fractures along the right anterolateral seventh through ninth ribs near the costochondral junctions. Pt was supposed to be discharge from ED but HHA left and patients family maintains they are not comfortable taking care of her and want her admitted. Pt will be admitted to medicine floor for PT eval and social work consult. Patient is a 86 yo female from home (with HHA 12hr/day, 7days/wk), baseline walking with assistant baseline AAO x1 with PMH of TIA, severe dementia, depression, renal cell cancer (s/p Rt nephrectomy ) BIB her family and aide to ED complains of right rib pain, s/p fall. CT chest shows  Non-displaced rib fractures along the right anterolateral seventh through ninth ribs near the costochondral junctions. Pt was supposed to be discharge from ED but HHA left and patients family maintains they are not comfortable taking care of her and want her admitted. Pt will be admitted to medicine floor for PT eval and social work consult.

## 2018-05-21 ENCOUNTER — TRANSCRIPTION ENCOUNTER (OUTPATIENT)
Age: 83
End: 2018-05-21

## 2018-05-21 LAB
ANION GAP SERPL CALC-SCNC: 5 MMOL/L — SIGNIFICANT CHANGE UP (ref 5–17)
BUN SERPL-MCNC: 17 MG/DL — SIGNIFICANT CHANGE UP (ref 7–18)
CALCIUM SERPL-MCNC: 9.3 MG/DL — SIGNIFICANT CHANGE UP (ref 8.4–10.5)
CHLORIDE SERPL-SCNC: 102 MMOL/L — SIGNIFICANT CHANGE UP (ref 96–108)
CO2 SERPL-SCNC: 32 MMOL/L — HIGH (ref 22–31)
CREAT SERPL-MCNC: 1.1 MG/DL — SIGNIFICANT CHANGE UP (ref 0.5–1.3)
GLUCOSE SERPL-MCNC: 84 MG/DL — SIGNIFICANT CHANGE UP (ref 70–99)
HCT VFR BLD CALC: 39.6 % — SIGNIFICANT CHANGE UP (ref 34.5–45)
HGB BLD-MCNC: 12.3 G/DL — SIGNIFICANT CHANGE UP (ref 11.5–15.5)
MCHC RBC-ENTMCNC: 29 PG — SIGNIFICANT CHANGE UP (ref 27–34)
MCHC RBC-ENTMCNC: 31.1 GM/DL — LOW (ref 32–36)
MCV RBC AUTO: 93.1 FL — SIGNIFICANT CHANGE UP (ref 80–100)
PLATELET # BLD AUTO: 307 K/UL — SIGNIFICANT CHANGE UP (ref 150–400)
POTASSIUM SERPL-MCNC: 4.2 MMOL/L — SIGNIFICANT CHANGE UP (ref 3.5–5.3)
POTASSIUM SERPL-SCNC: 4.2 MMOL/L — SIGNIFICANT CHANGE UP (ref 3.5–5.3)
RBC # BLD: 4.25 M/UL — SIGNIFICANT CHANGE UP (ref 3.8–5.2)
RBC # FLD: 13.7 % — SIGNIFICANT CHANGE UP (ref 10.3–14.5)
SODIUM SERPL-SCNC: 139 MMOL/L — SIGNIFICANT CHANGE UP (ref 135–145)
WBC # BLD: 7.2 K/UL — SIGNIFICANT CHANGE UP (ref 3.8–10.5)
WBC # FLD AUTO: 7.2 K/UL — SIGNIFICANT CHANGE UP (ref 3.8–10.5)

## 2018-05-21 RX ORDER — FAMOTIDINE 10 MG/ML
20 INJECTION INTRAVENOUS DAILY
Qty: 0 | Refills: 0 | Status: DISCONTINUED | OUTPATIENT
Start: 2018-05-21 | End: 2018-05-23

## 2018-05-21 RX ORDER — TRAMADOL HYDROCHLORIDE 50 MG/1
25 TABLET ORAL EVERY 4 HOURS
Qty: 0 | Refills: 0 | Status: DISCONTINUED | OUTPATIENT
Start: 2018-05-21 | End: 2018-05-23

## 2018-05-21 RX ADMIN — ASPIRIN AND DIPYRIDAMOLE 1 CAPSULE(S): 25; 200 CAPSULE, EXTENDED RELEASE ORAL at 05:03

## 2018-05-21 RX ADMIN — TRAMADOL HYDROCHLORIDE 25 MILLIGRAM(S): 50 TABLET ORAL at 10:40

## 2018-05-21 RX ADMIN — ENOXAPARIN SODIUM 30 MILLIGRAM(S): 100 INJECTION SUBCUTANEOUS at 12:06

## 2018-05-21 RX ADMIN — FAMOTIDINE 20 MILLIGRAM(S): 10 INJECTION INTRAVENOUS at 15:01

## 2018-05-21 RX ADMIN — Medication 1 MILLIGRAM(S): at 20:35

## 2018-05-21 RX ADMIN — OXYCODONE AND ACETAMINOPHEN 1 TABLET(S): 5; 325 TABLET ORAL at 01:51

## 2018-05-21 RX ADMIN — TRAMADOL HYDROCHLORIDE 25 MILLIGRAM(S): 50 TABLET ORAL at 21:35

## 2018-05-21 RX ADMIN — SIMVASTATIN 20 MILLIGRAM(S): 20 TABLET, FILM COATED ORAL at 21:33

## 2018-05-21 RX ADMIN — OXYCODONE AND ACETAMINOPHEN 1 TABLET(S): 5; 325 TABLET ORAL at 02:21

## 2018-05-21 RX ADMIN — Medication 1 MILLIGRAM(S): at 00:52

## 2018-05-21 RX ADMIN — MEMANTINE HYDROCHLORIDE 10 MILLIGRAM(S): 10 TABLET ORAL at 05:03

## 2018-05-21 RX ADMIN — DULOXETINE HYDROCHLORIDE 30 MILLIGRAM(S): 30 CAPSULE, DELAYED RELEASE ORAL at 05:03

## 2018-05-21 RX ADMIN — TRAMADOL HYDROCHLORIDE 25 MILLIGRAM(S): 50 TABLET ORAL at 20:35

## 2018-05-21 RX ADMIN — MEMANTINE HYDROCHLORIDE 10 MILLIGRAM(S): 10 TABLET ORAL at 17:52

## 2018-05-21 RX ADMIN — DULOXETINE HYDROCHLORIDE 30 MILLIGRAM(S): 30 CAPSULE, DELAYED RELEASE ORAL at 17:52

## 2018-05-21 RX ADMIN — DONEPEZIL HYDROCHLORIDE 10 MILLIGRAM(S): 10 TABLET, FILM COATED ORAL at 21:33

## 2018-05-21 RX ADMIN — ASPIRIN AND DIPYRIDAMOLE 1 CAPSULE(S): 25; 200 CAPSULE, EXTENDED RELEASE ORAL at 17:52

## 2018-05-21 RX ADMIN — TRAMADOL HYDROCHLORIDE 25 MILLIGRAM(S): 50 TABLET ORAL at 11:00

## 2018-05-21 NOTE — PROGRESS NOTE ADULT - ASSESSMENT
Patient is a 84 yo female from home (with HHA 12hr/day, 7days/wk), baseline walking with assistant baseline AAO x1 with PMH of TIA, severe dementia, depression, renal cell cancer (s/p Rt nephrectomy ) BIB her family and aide to ED complains of right rib pain, s/p fall. CT chest shows  Non-displaced rib fractures along the right anterolateral seventh through ninth ribs near the costochondral junctions. Pt was supposed to be discharge from ED but HHA left and patients family maintains they are not comfortable taking care of her and want her admitted. Pt will be admitted to medicine floor for PT eval and social work consult. A 84 yo female from home (with HHA 12hr/day, 7days/wk), baseline walking with assistant baseline AAO x1 with PMH of TIA, severe dementia, depression, renal cell cancer (s/p Rt nephrectomy ) BIB her family and aide to ED complains of right rib pain, s/p fall found to have Non-displaced rib fractures along the right anterolateral seventh through ninth ribs near the costochondral junctions,  admitted to medicine floor for PT eval and social work consult.

## 2018-05-21 NOTE — PROGRESS NOTE ADULT - PROBLEM SELECTOR PLAN 1
s/p fall, hx of multiple falls recently  CT head negative and CT cervical spine No acute cervical spine fracture  CT chest shows Non-displaced rib fractures along the right anterolateral seventh through ninth ribs near the costochondral junctions  started tylenol and percocet  PRN for pain management  fall precaution   f/u PT eval  f/u  consult s/p fall, hx of multiple falls recently  CT head negative and CT cervical spine No acute cervical spine fracture  CT chest shows Non-displaced rib fractures along the right anterolateral seventh through ninth ribs near the costochondral junctions  continue tylenol and percocet  PRN for pain management  fall precaution   f/u PT eval  f/u  consult for safe dispo  will get  for safe dispo.

## 2018-05-21 NOTE — PROGRESS NOTE ADULT - PROBLEM SELECTOR PLAN 4
IMPROVE VTE Individual Risk Assessment    RISK                                                          Points  [] Previous VTE                                           3  [] Thrombophilia                                        2  [] Lower limb paralysis                              2   [] Current Cancer                                       2   [x] Immobilization > 24 hrs                        1  [] ICU/CCU stay > 24 hours                       1  [x] Age > 60                                                   1    IMPROVE VTE Score: 2    need for DVT ppx, on lovenox   NO need for GI ppx IMPROVE VTE Individual Risk Assessment    RISK                                                          Points  [] Previous VTE                                           3  [] Thrombophilia                                        2  [] Lower limb paralysis                              2   [] Current Cancer                                       2   [x] Immobilization > 24 hrs                        1  [] ICU/CCU stay > 24 hours                       1  [x] Age > 60                                                   1    IMPROVE VTE Score: 2    need for DVT ppx, on lovenox   GI ppx with pepcid.  NO need for GI ppx IMPROVE VTE Individual Risk Assessment    RISK                                                          Points  [] Previous VTE                                           3  [] Thrombophilia                                        2  [] Lower limb paralysis                              2   [] Current Cancer                                       2   [x] Immobilization > 24 hrs                        1  [] ICU/CCU stay > 24 hours                       1  [x] Age > 60                                                   1    IMPROVE VTE Score: 2    need for DVT ppx, on lovenox   GI ppx with pepcid.

## 2018-05-21 NOTE — DISCHARGE NOTE ADULT - MEDICATION SUMMARY - MEDICATIONS TO STOP TAKING
I will STOP taking the medications listed below when I get home from the hospital:    Drisdol 50,000 intl units (1.25 mg) oral capsule  -- 1 cap(s) by mouth once a week

## 2018-05-21 NOTE — DISCHARGE NOTE ADULT - CARE PLAN
Principal Discharge DX:	Fall, subsequent encounter  Goal:	prevention of recurrence  Assessment and plan of treatment:	advise to use handrails while using stairs and walk with assistance all the time to prevent falls, need physical therapy, hence being discharged to rehab.  Secondary Diagnosis:	Dementia  Goal:	prevention of worsening  Assessment and plan of treatment:	advise to continue home dose of aricept and donepezil as earlier and please follow up with PCP as outpatient.  Secondary Diagnosis:	Right rib fracture  Goal:	pain control and prevention of complications  Assessment and plan of treatment:	we found that you have fractures of right side 7-9 ribs which are not displaced, hence being managed with pain medications and physical therapy. advise to follow up with PCP within a week of discharge.  Secondary Diagnosis:	Depression  Goal:	control of symptoms  Assessment and plan of treatment:	advise to continue home dose of medications for depression as earlier and please follow up with psychiatry as outpatient. Principal Discharge DX:	Fall, subsequent encounter  Goal:	prevention of recurrence  Assessment and plan of treatment:	advise to use handrails while using stairs and walk with assistance all the time to prevent falls, need physical therapy, hence being discharged to rehab.  Secondary Diagnosis:	Dementia  Goal:	prevention of worsening  Assessment and plan of treatment:	advise to continue home dose of medication for dementia as earlier and please follow up with PCP as outpatient.  Secondary Diagnosis:	Right rib fracture  Goal:	pain control and prevention of complications  Assessment and plan of treatment:	we found that you have fractures of right side 7-9 ribs which are not displaced, hence being managed with pain medications and physical therapy. advise to follow up with PCP within a week of discharge.  Secondary Diagnosis:	Depression  Goal:	control of symptoms  Assessment and plan of treatment:	advise to continue home dose of medications for depression as earlier and please follow up with psychiatry as outpatient.

## 2018-05-21 NOTE — PROGRESS NOTE ADULT - PROBLEM SELECTOR PLAN 2
baseline AAOX1  c/w home meds for now baseline AAOX1  will continue home dose of aricept and namenda

## 2018-05-21 NOTE — DISCHARGE NOTE ADULT - PATIENT PORTAL LINK FT
You can access the [facility], offered by [facility], by registering with the following website: [email or web address]

## 2018-05-21 NOTE — DISCHARGE NOTE ADULT - HOSPITAL COURSE
A 86 yo female from home (with HHA 12hr/day, 7days/wk), baseline walking with assistant baseline AAO x1 with PMH of TIA, severe dementia, depression, renal cell cancer (s/p Rt nephrectomy ) BIB her family and aide to ED complains of right rib pain, s/p fall.  As per family, patient was sitting in a chair with her aide when the aide went to bathroom, subsequently patient was found on the ground and complaining of pain in the right rib. Pt had no head injury as per daughter. Pt was recently [facility] for s/p multiple falls on 04/12/2018 and no fracture at that time. ED course: pt vitals stable, labs wnl, CT head negative, CT cervical spine No acute cervical spine fracture. CT chest shows  Non-displaced rib fractures along the right anterolateral seventh through ninth ribs near the costochondral junctions, hence was admitted to medicine floor for PT eval and social work consult. Patient was managed conservatively with pain management, PT evaluated recommended KARLA. patient's dementia and depression medications were continued.     Patient is stable for discharge per attending and is advised to follow up with PCP as outpatient   Please refer to patient's complete medical chart with documents for a full hospital course, for this is only a brief summary.

## 2018-05-21 NOTE — DISCHARGE NOTE ADULT - MEDICATION SUMMARY - MEDICATIONS TO TAKE
I will START or STAY ON the medications listed below when I get home from the hospital:    acetaminophen 500 mg oral tablet  -- 1 tab(s) by mouth every 6 hours, As Needed -for moderate pain  -- This product contains acetaminophen.  Do not use  with any other product containing acetaminophen to prevent possible liver damage.    -- Indication: For Pain management    traMADol 50 mg oral tablet  -- 0.5 tab(s) by mouth every 4 hours, As needed, Severe Pain (7 - 10)  -- Indication: For Pain management    clonazePAM 1 mg oral tablet  -- 1 tab(s) by mouth 3 times a day, As Needed for anxiety  -- Indication: For anxiety    DULoxetine 30 mg oral delayed release capsule  -- orally 2 times a day  -- Indication: For Depression    simvastatin 20 mg oral tablet  -- 1 tab(s) by mouth once a day (at bedtime)  -- Indication: For hyperlipidemia    aspirin-dipyridamole 25 mg-200 mg oral capsule, extended release  -- 1 cap(s) by mouth 2 times a day  -- Indication: For cad    metoprolol succinate 50 mg oral tablet, extended release  -- 1 tab(s) by mouth once a day  -- Indication: For hypertension    Namzaric 28 mg-10 mg oral capsule, extended release  -- 1 cap(s) by mouth once a day  -- Indication: For Dementia    lidocaine 4% topical film  -- Apply on skin to affected area once a day   -- For external use only.  Remove old patch prior to applying a new patch.    -- Indication: For Pain management    famotidine 20 mg oral tablet  -- 1 tab(s) by mouth once a day  -- Indication: For gastritis    Nuedexta 20 mg-10 mg oral capsule  -- 1 cap(s) by mouth every 12 hours  -- Indication: For Dementia    ergocalciferol 50,000 intl units (1.25 mg) oral capsule  -- 1 cap(s) by mouth once a week  -- Indication: For supplement

## 2018-05-21 NOTE — PHYSICAL THERAPY INITIAL EVALUATION ADULT - PERTINENT HX OF CURRENT PROBLEM, REHAB EVAL
Patient was brought to ED from home s/p fall at home.CT chest shows non-displaced fractures of R 7 to 9 th ribs

## 2018-05-21 NOTE — DISCHARGE NOTE ADULT - PLAN OF CARE
prevention of recurrence advise to use handrails while using stairs and walk with assistance all the time to prevent falls, need physical therapy, hence being discharged to rehab. prevention of worsening advise to continue home dose of aricept and donepezil as earlier and please follow up with PCP as outpatient. pain control and prevention of complications we found that you have fractures of right side 7-9 ribs which are not displaced, hence being managed with pain medications and physical therapy. advise to follow up with PCP within a week of discharge. control of symptoms advise to continue home dose of medications for depression as earlier and please follow up with psychiatry as outpatient. advise to continue home dose of medication for dementia as earlier and please follow up with PCP as outpatient.

## 2018-05-22 RX ORDER — FAMOTIDINE 10 MG/ML
1 INJECTION INTRAVENOUS
Qty: 14 | Refills: 0 | OUTPATIENT
Start: 2018-05-22 | End: 2018-06-04

## 2018-05-22 RX ORDER — LIDOCAINE 4 G/100G
1 CREAM TOPICAL
Qty: 7 | Refills: 0 | OUTPATIENT
Start: 2018-05-22 | End: 2018-05-28

## 2018-05-22 RX ORDER — TRAMADOL HYDROCHLORIDE 50 MG/1
0.5 TABLET ORAL
Qty: 0 | Refills: 0 | COMMUNITY
Start: 2018-05-22

## 2018-05-22 RX ADMIN — ASPIRIN AND DIPYRIDAMOLE 1 CAPSULE(S): 25; 200 CAPSULE, EXTENDED RELEASE ORAL at 05:35

## 2018-05-22 RX ADMIN — MEMANTINE HYDROCHLORIDE 10 MILLIGRAM(S): 10 TABLET ORAL at 17:03

## 2018-05-22 RX ADMIN — DULOXETINE HYDROCHLORIDE 30 MILLIGRAM(S): 30 CAPSULE, DELAYED RELEASE ORAL at 05:35

## 2018-05-22 RX ADMIN — DONEPEZIL HYDROCHLORIDE 10 MILLIGRAM(S): 10 TABLET, FILM COATED ORAL at 22:09

## 2018-05-22 RX ADMIN — DULOXETINE HYDROCHLORIDE 30 MILLIGRAM(S): 30 CAPSULE, DELAYED RELEASE ORAL at 17:02

## 2018-05-22 RX ADMIN — MEMANTINE HYDROCHLORIDE 10 MILLIGRAM(S): 10 TABLET ORAL at 05:35

## 2018-05-22 RX ADMIN — Medication 1 MILLIGRAM(S): at 22:09

## 2018-05-22 RX ADMIN — TRAMADOL HYDROCHLORIDE 25 MILLIGRAM(S): 50 TABLET ORAL at 17:01

## 2018-05-22 RX ADMIN — TRAMADOL HYDROCHLORIDE 25 MILLIGRAM(S): 50 TABLET ORAL at 17:30

## 2018-05-22 RX ADMIN — TRAMADOL HYDROCHLORIDE 25 MILLIGRAM(S): 50 TABLET ORAL at 06:00

## 2018-05-22 RX ADMIN — TRAMADOL HYDROCHLORIDE 25 MILLIGRAM(S): 50 TABLET ORAL at 23:30

## 2018-05-22 RX ADMIN — TRAMADOL HYDROCHLORIDE 25 MILLIGRAM(S): 50 TABLET ORAL at 05:38

## 2018-05-22 RX ADMIN — ASPIRIN AND DIPYRIDAMOLE 1 CAPSULE(S): 25; 200 CAPSULE, EXTENDED RELEASE ORAL at 17:02

## 2018-05-22 RX ADMIN — SIMVASTATIN 20 MILLIGRAM(S): 20 TABLET, FILM COATED ORAL at 22:09

## 2018-05-22 RX ADMIN — ENOXAPARIN SODIUM 30 MILLIGRAM(S): 100 INJECTION SUBCUTANEOUS at 12:26

## 2018-05-22 RX ADMIN — FAMOTIDINE 20 MILLIGRAM(S): 10 INJECTION INTRAVENOUS at 12:26

## 2018-05-22 RX ADMIN — TRAMADOL HYDROCHLORIDE 25 MILLIGRAM(S): 50 TABLET ORAL at 22:09

## 2018-05-22 NOTE — PROGRESS NOTE ADULT - PROBLEM SELECTOR PLAN 1
s/p fall, hx of multiple falls recently  CT head negative and CT cervical spine No acute cervical spine fracture  CT chest shows Non-displaced rib fractures along the right anterolateral seventh through ninth ribs near the costochondral junctions  continue tylenol and percocet  PRN for pain management  fall precaution   f/u PT eval  f/u  consult for safe dispo  will get  for safe dispo. s/p fall, hx of multiple falls recently  CT head negative and CT cervical spine No acute cervical spine fracture  CT chest shows Non-displaced rib fractures along the right anterolateral seventh through ninth ribs near the costochondral junctions  continue tylenol and percocet  PRN for pain management  fall precaution    PT eval- recommended KARLA.   working on safe disposition plan.

## 2018-05-22 NOTE — PROGRESS NOTE ADULT - PROBLEM SELECTOR PLAN 4
IMPROVE VTE Individual Risk Assessment    RISK                                                          Points  [] Previous VTE                                           3  [] Thrombophilia                                        2  [] Lower limb paralysis                              2   [] Current Cancer                                       2   [x] Immobilization > 24 hrs                        1  [] ICU/CCU stay > 24 hours                       1  [x] Age > 60                                                   1    IMPROVE VTE Score: 2    need for DVT ppx, on lovenox   GI ppx with pepcid.

## 2018-05-22 NOTE — PROGRESS NOTE ADULT - SUBJECTIVE AND OBJECTIVE BOX
Resident Note discussed with  primary attending    Patient is a 85y old  Female who presents with a chief complaint of right rib pain (21 May 2018 17:12)      INTERVAL HPI/OVERNIGHT EVENTS: no new complaints    MEDICATIONS  (STANDING):  dipyridamole 200 mG/aspirin 25 mG 1 Capsule(s) Oral two times a day  donepezil 10 milliGRAM(s) Oral at bedtime  DULoxetine 30 milliGRAM(s) Oral two times a day  enoxaparin Injectable 30 milliGRAM(s) SubCutaneous daily  famotidine    Tablet 20 milliGRAM(s) Oral daily  memantine 10 milliGRAM(s) Oral two times a day  metoprolol succinate ER 50 milliGRAM(s) Oral daily  simvastatin 20 milliGRAM(s) Oral at bedtime    MEDICATIONS  (PRN):  acetaminophen   Tablet 650 milliGRAM(s) Oral every 6 hours PRN mild pain  clonazePAM Tablet 1 milliGRAM(s) Oral three times a day PRN anxiety  traMADol 25 milliGRAM(s) Oral every 4 hours PRN Severe Pain (7 - 10)      __________________________________________________  REVIEW OF SYSTEMS:    CONSTITUTIONAL: No fever,   EYES: no acute visual disturbances  NECK: No pain or stiffness  RESPIRATORY: No cough; No shortness of breath  CARDIOVASCULAR: No chest pain, no palpitations  GASTROINTESTINAL: No pain. No nausea or vomiting; No diarrhea   NEUROLOGICAL: No headache or numbness, no tremors  MUSCULOSKELETAL: No joint pain, no muscle pain  GENITOURINARY: no dysuria, no frequency, no hesitancy  PSYCHIATRY: no depression , no anxiety  ALL OTHER  ROS negative        Vital Signs Last 24 Hrs  T(C): 36.4 (22 May 2018 05:15), Max: 37.2 (21 May 2018 22:29)  T(F): 97.5 (22 May 2018 05:15), Max: 99 (21 May 2018 22:29)  HR: 89 (22 May 2018 05:15) (73 - 89)  BP: 128/80 (22 May 2018 05:15) (108/56 - 135/66)  BP(mean): --  RR: 14 (22 May 2018 05:15) (14 - 17)  SpO2: 98% (22 May 2018 05:15) (95% - 100%)    ________________________________________________  PHYSICAL EXAM:  GENERAL: NAD  HEENT:Normocephalic;  conjunctivae and sclerae clear; moist mucous membranes;   NECK : supple  CHEST/LUNG: Clear to auscuitation bilaterally with good air entry   HEART: S1 S2  regular; no murmurs, gallops or rubs  ABDOMEN: Soft, Nontender, Nondistended; Bowel sounds present  EXTREMITIES: no cyanosis; no edema; no calf tenderness  NERVOUS SYSTEM:  Awake and alert; Oriented  to place, person and time ; no new deficits    _________________________________________________  LABS:                        12.3   7.2   )-----------( 307      ( 21 May 2018 07:32 )             39.6     05-21    139  |  102  |  17  ----------------------------<  84  4.2   |  32<H>  |  1.10    Ca    9.3      21 May 2018 07:32          CAPILLARY BLOOD GLUCOSE            RADIOLOGY & ADDITIONAL TESTS:    Imaging Personally Reviewed:  YES/NO    Consultant(s) Notes Reviewed:   YES/ No    Care Discussed with Consultants :     Plan of care was discussed with patient and /or primary care giver; all questions and concerns were addressed and care was aligned with patient's wishes. Resident Note discussed with  primary attending    Patient is a 85y old  Female who presents with a chief complaint of right rib pain (21 May 2018 17:12)      INTERVAL HPI/OVERNIGHT EVENTS: no new complaints overnight. Patient c/o pain in bilateral rib cage. denies any other symptoms.    MEDICATIONS  (STANDING):  dipyridamole 200 mG/aspirin 25 mG 1 Capsule(s) Oral two times a day  donepezil 10 milliGRAM(s) Oral at bedtime  DULoxetine 30 milliGRAM(s) Oral two times a day  enoxaparin Injectable 30 milliGRAM(s) SubCutaneous daily  famotidine    Tablet 20 milliGRAM(s) Oral daily  memantine 10 milliGRAM(s) Oral two times a day  metoprolol succinate ER 50 milliGRAM(s) Oral daily  simvastatin 20 milliGRAM(s) Oral at bedtime    MEDICATIONS  (PRN):  acetaminophen   Tablet 650 milliGRAM(s) Oral every 6 hours PRN mild pain  clonazePAM Tablet 1 milliGRAM(s) Oral three times a day PRN anxiety  traMADol 25 milliGRAM(s) Oral every 4 hours PRN Severe Pain (7 - 10)      __________________________________________________  REVIEW OF SYSTEMS:    CONSTITUTIONAL: No fever  RESPIRATORY: No cough  GASTROINTESTINAL: No pain. No nausea or vomiting; No diarrhea   MUSCULOSKELETAL: pain over Rt subcostal region  limited ROS due to patient's clinical status    Vital Signs Last 24 Hrs  T(C): 36.4 (22 May 2018 05:15), Max: 37.2 (21 May 2018 22:29)  T(F): 97.5 (22 May 2018 05:15), Max: 99 (21 May 2018 22:29)  HR: 89 (22 May 2018 05:15) (73 - 89)  BP: 128/80 (22 May 2018 05:15) (108/56 - 135/66)  BP(mean): --  RR: 14 (22 May 2018 05:15) (14 - 17)  SpO2: 98% (22 May 2018 05:15) (95% - 100%)    ________________________________________________  PHYSICAL EXAM:  GENERAL: patient is lying comfortably  in bed, not in distress  HEENT: Normocephalic;  conjunctivae and sclerae clear; moist mucous membranes;   NECK : supple  CHEST/LUNG: Clear to auscultation bilaterally with good air entry   HEART: S1 S2  regular; murmur+  ABDOMEN: Soft, Nontender, Nondistended; Bowel sounds present  EXTREMITIES: no cyanosis; no edema; no calf tenderness, tenderness noted over Rt lower rib cage  NERVOUS SYSTEM:  Awake and alert; disoriented    _________________________________________________  LABS:                        12.3   7.2   )-----------( 307      ( 21 May 2018 07:32 )             39.6     05-21    139  |  102  |  17  ----------------------------<  84  4.2   |  32<H>  |  1.10    Ca    9.3      21 May 2018 07:32          CAPILLARY BLOOD GLUCOSE            RADIOLOGY & ADDITIONAL TESTS: NO NEW IMAGING STUDIES PERFORMED TODAY.        Plan of care was discussed with patient and /or primary care giver; all questions and concerns were addressed and care was aligned with patient's wishes.

## 2018-05-22 NOTE — PROGRESS NOTE ADULT - ASSESSMENT
A 84 yo female from home (with HHA 12hr/day, 7days/wk), baseline walking with assistant baseline AAO x1 with PMH of TIA, severe dementia, depression, renal cell cancer (s/p Rt nephrectomy ) BIB her family and aide to ED complains of right rib pain, s/p fall found to have Non-displaced rib fractures along the right anterolateral seventh through ninth ribs near the costochondral junctions,  admitted to medicine floor for PT eval and social work consult.

## 2018-05-23 VITALS
SYSTOLIC BLOOD PRESSURE: 101 MMHG | RESPIRATION RATE: 18 BRPM | HEART RATE: 102 BPM | OXYGEN SATURATION: 100 % | DIASTOLIC BLOOD PRESSURE: 68 MMHG | TEMPERATURE: 99 F

## 2018-05-23 PROCEDURE — 80048 BASIC METABOLIC PNL TOTAL CA: CPT

## 2018-05-23 PROCEDURE — 71250 CT THORAX DX C-: CPT

## 2018-05-23 PROCEDURE — 72125 CT NECK SPINE W/O DYE: CPT

## 2018-05-23 PROCEDURE — 93005 ELECTROCARDIOGRAM TRACING: CPT

## 2018-05-23 PROCEDURE — 70450 CT HEAD/BRAIN W/O DYE: CPT

## 2018-05-23 PROCEDURE — 82306 VITAMIN D 25 HYDROXY: CPT

## 2018-05-23 PROCEDURE — 80053 COMPREHEN METABOLIC PANEL: CPT

## 2018-05-23 PROCEDURE — 99285 EMERGENCY DEPT VISIT HI MDM: CPT | Mod: 25

## 2018-05-23 PROCEDURE — 85027 COMPLETE CBC AUTOMATED: CPT

## 2018-05-23 RX ADMIN — MEMANTINE HYDROCHLORIDE 10 MILLIGRAM(S): 10 TABLET ORAL at 05:28

## 2018-05-23 RX ADMIN — DULOXETINE HYDROCHLORIDE 30 MILLIGRAM(S): 30 CAPSULE, DELAYED RELEASE ORAL at 05:28

## 2018-05-23 RX ADMIN — ENOXAPARIN SODIUM 30 MILLIGRAM(S): 100 INJECTION SUBCUTANEOUS at 12:58

## 2018-05-23 RX ADMIN — ASPIRIN AND DIPYRIDAMOLE 1 CAPSULE(S): 25; 200 CAPSULE, EXTENDED RELEASE ORAL at 05:28

## 2018-05-23 RX ADMIN — FAMOTIDINE 20 MILLIGRAM(S): 10 INJECTION INTRAVENOUS at 12:58

## 2019-02-11 NOTE — ED ADULT NURSE NOTE - ED STAT RN HANDOFF TIME
Call to pt.  States has been taking colestid 1 gm as instructed with note of 1/23.  Has not noted any improvement in symptoms.      Declines to make f/u appt at this time - [facility] will await DR Ramsay recommendations.   07:25 07:27

## 2019-05-21 NOTE — ED PROVIDER NOTE - CONSTITUTIONAL DEVELOPMENT, MLM
Name: Eva Mcdonnell  : 1947  MRN: [id number]    Oncology Navigation- Initial Note:    Navigator calling and could only leave a message, pt. Having trouble while receiving dialysis and may be on her way to [facility]? Plan to follow for POC.   Electronically signed by Blake Hardin RN on 2019 at 12:54 PM
well developed

## 2023-02-22 NOTE — DISCHARGE NOTE ADULT - HOSPITAL COURSE
PAST MEDICAL HISTORY:  Bimalleolar fracture of right ankle     
Patient is an 84 year-old female with PMH of dementia (severe, AOx1 at baseline), TIA, renal cell cancer s/p right nephrectomy who was brought to the hospital for frequent falls. She complained of right lower rib pain. She was admitted to medicine for further management. Initial laboratory studies were within normal limits, vital signs were normal, urinalysis negative, no fractures noted on CT scans. She was evaluated by physical therapy who recommended home with home PT and 24/7 . CT scan of the chest showed a 4mm right middle lobe lung nodule which can be followed up with repeat CT scan in 12 months. She also had a 44mm right thyroid nodule and 2 left renal cysts measuring up to 1.4cm. TSH was elevated at 6.67 but free T4 and T3 were within normal limits. Patient is medically stable for discharge to home with 24/7 support.

## 2024-09-16 NOTE — PATIENT PROFILE ADULT. - NS PRO REFERRAL CMGT
Have You Had Microneedling Treatments Before?: has not had a previous microneedling treatment
Receiving homecare prior to admission